# Patient Record
Sex: MALE | Race: WHITE | NOT HISPANIC OR LATINO
[De-identification: names, ages, dates, MRNs, and addresses within clinical notes are randomized per-mention and may not be internally consistent; named-entity substitution may affect disease eponyms.]

---
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
## 2019-01-07 ENCOUNTER — OUTPATIENT (OUTPATIENT)
Dept: OUTPATIENT SERVICES | Facility: HOSPITAL | Age: 19
LOS: 1 days | Discharge: HOME | End: 2019-01-07

## 2019-01-07 DIAGNOSIS — F32.1 MAJOR DEPRESSIVE DISORDER, SINGLE EPISODE, MODERATE: ICD-10-CM

## 2019-01-08 ENCOUNTER — OUTPATIENT (OUTPATIENT)
Dept: OUTPATIENT SERVICES | Facility: HOSPITAL | Age: 19
LOS: 1 days | Discharge: HOME | End: 2019-01-08

## 2019-01-08 DIAGNOSIS — F32.1 MAJOR DEPRESSIVE DISORDER, SINGLE EPISODE, MODERATE: ICD-10-CM

## 2019-04-02 ENCOUNTER — OUTPATIENT (OUTPATIENT)
Dept: OUTPATIENT SERVICES | Facility: HOSPITAL | Age: 19
LOS: 1 days | Discharge: HOME | End: 2019-04-02

## 2019-04-02 DIAGNOSIS — F32.1 MAJOR DEPRESSIVE DISORDER, SINGLE EPISODE, MODERATE: ICD-10-CM

## 2019-04-16 ENCOUNTER — OUTPATIENT (OUTPATIENT)
Dept: OUTPATIENT SERVICES | Facility: HOSPITAL | Age: 19
LOS: 1 days | Discharge: HOME | End: 2019-04-16

## 2019-04-16 DIAGNOSIS — F32.1 MAJOR DEPRESSIVE DISORDER, SINGLE EPISODE, MODERATE: ICD-10-CM

## 2019-04-24 NOTE — PROGRESS NOTE BEHAVIORAL HEALTH - SECONDARY DX1
Prophylactic measure
OCD (obsessive compulsive disorder)
Type 2 diabetes mellitus
OCD (obsessive compulsive disorder)

## 2019-05-14 ENCOUNTER — OUTPATIENT (OUTPATIENT)
Dept: OUTPATIENT SERVICES | Facility: HOSPITAL | Age: 19
LOS: 1 days | Discharge: HOME | End: 2019-05-14
Payer: COMMERCIAL

## 2019-05-14 DIAGNOSIS — F42.2 MIXED OBSESSIONAL THOUGHTS AND ACTS: ICD-10-CM

## 2019-05-14 PROCEDURE — 99213 OFFICE O/P EST LOW 20 MIN: CPT | Mod: GC
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
## 2019-07-11 ENCOUNTER — OUTPATIENT (OUTPATIENT)
Dept: OUTPATIENT SERVICES | Facility: HOSPITAL | Age: 19
LOS: 1 days | Discharge: HOME | End: 2019-07-11
Payer: COMMERCIAL

## 2019-07-11 DIAGNOSIS — F32.1 MAJOR DEPRESSIVE DISORDER, SINGLE EPISODE, MODERATE: ICD-10-CM

## 2019-07-11 DIAGNOSIS — F42.9 OBSESSIVE-COMPULSIVE DISORDER, UNSPECIFIED: ICD-10-CM

## 2019-07-11 PROCEDURE — 99214 OFFICE O/P EST MOD 30 MIN: CPT | Mod: GC

## 2019-07-18 ENCOUNTER — OUTPATIENT (OUTPATIENT)
Dept: OUTPATIENT SERVICES | Facility: HOSPITAL | Age: 19
LOS: 1 days | Discharge: HOME | End: 2019-07-18
Payer: COMMERCIAL

## 2019-07-18 DIAGNOSIS — F42.9 OBSESSIVE-COMPULSIVE DISORDER, UNSPECIFIED: ICD-10-CM

## 2019-07-18 DIAGNOSIS — F42.2 MIXED OBSESSIONAL THOUGHTS AND ACTS: ICD-10-CM

## 2019-07-18 PROCEDURE — 99214 OFFICE O/P EST MOD 30 MIN: CPT | Mod: GC

## 2019-08-01 ENCOUNTER — OUTPATIENT (OUTPATIENT)
Dept: OUTPATIENT SERVICES | Facility: HOSPITAL | Age: 19
LOS: 1 days | Discharge: HOME | End: 2019-08-01
Payer: COMMERCIAL

## 2019-08-01 DIAGNOSIS — F32.1 MAJOR DEPRESSIVE DISORDER, SINGLE EPISODE, MODERATE: ICD-10-CM

## 2019-08-01 PROCEDURE — 99213 OFFICE O/P EST LOW 20 MIN: CPT | Mod: GC

## 2019-08-22 ENCOUNTER — OUTPATIENT (OUTPATIENT)
Dept: OUTPATIENT SERVICES | Facility: HOSPITAL | Age: 19
LOS: 1 days | Discharge: HOME | End: 2019-08-22
Payer: COMMERCIAL

## 2019-08-22 DIAGNOSIS — F32.1 MAJOR DEPRESSIVE DISORDER, SINGLE EPISODE, MODERATE: ICD-10-CM

## 2019-08-22 DIAGNOSIS — F42.2 MIXED OBSESSIONAL THOUGHTS AND ACTS: ICD-10-CM

## 2019-08-22 PROCEDURE — 99213 OFFICE O/P EST LOW 20 MIN: CPT | Mod: GC

## 2019-08-27 ENCOUNTER — OUTPATIENT (OUTPATIENT)
Dept: OUTPATIENT SERVICES | Facility: HOSPITAL | Age: 19
LOS: 1 days | Discharge: HOME | End: 2019-08-27

## 2019-08-27 DIAGNOSIS — E78.00 PURE HYPERCHOLESTEROLEMIA, UNSPECIFIED: ICD-10-CM

## 2019-09-16 ENCOUNTER — OUTPATIENT (OUTPATIENT)
Dept: OUTPATIENT SERVICES | Facility: HOSPITAL | Age: 19
LOS: 1 days | Discharge: HOME | End: 2019-09-16
Payer: COMMERCIAL

## 2019-09-16 DIAGNOSIS — F32.1 MAJOR DEPRESSIVE DISORDER, SINGLE EPISODE, MODERATE: ICD-10-CM

## 2019-09-16 PROCEDURE — 99214 OFFICE O/P EST MOD 30 MIN: CPT

## 2019-11-06 ENCOUNTER — OUTPATIENT (OUTPATIENT)
Dept: OUTPATIENT SERVICES | Facility: HOSPITAL | Age: 19
LOS: 1 days | Discharge: HOME | End: 2019-11-06
Payer: COMMERCIAL

## 2019-11-06 DIAGNOSIS — F32.1 MAJOR DEPRESSIVE DISORDER, SINGLE EPISODE, MODERATE: ICD-10-CM

## 2019-11-06 PROCEDURE — 99214 OFFICE O/P EST MOD 30 MIN: CPT | Mod: GC

## 2019-11-06 NOTE — PROGRESS NOTE BEHAVIORAL HEALTH - MUSCLE TONE / STRENGTH
Spoke with physician and PA, patient remains outside of ED in wheelchair. Per provider, do not give blood pressure med, cancel CT scan. Spoke with patient and family outside.   
Normal muscle tone/strength

## 2020-01-21 NOTE — H&P ADULT - CONSTITUTIONAL
bilateral lower extremity strength 3+ to 4-/5 throughout, assessed in sitting Well-developed, well nourished

## 2020-04-08 ENCOUNTER — OUTPATIENT (OUTPATIENT)
Dept: OUTPATIENT SERVICES | Facility: HOSPITAL | Age: 20
LOS: 1 days | Discharge: HOME | End: 2020-04-08

## 2020-04-08 DIAGNOSIS — F42.9 OBSESSIVE-COMPULSIVE DISORDER, UNSPECIFIED: ICD-10-CM

## 2020-05-21 ENCOUNTER — OUTPATIENT (OUTPATIENT)
Dept: OUTPATIENT SERVICES | Facility: HOSPITAL | Age: 20
LOS: 1 days | Discharge: HOME | End: 2020-05-21

## 2020-05-22 DIAGNOSIS — F42.2 MIXED OBSESSIONAL THOUGHTS AND ACTS: ICD-10-CM

## 2020-05-22 DIAGNOSIS — F32.1 MAJOR DEPRESSIVE DISORDER, SINGLE EPISODE, MODERATE: ICD-10-CM

## 2020-07-07 NOTE — ED PEDIATRIC TRIAGE NOTE - NS_BH TRG QUESTION3_ED_ALL_ED
Patient seen 6/29. Gabapentin increased to 300 mg TID.  FYI
Pt calling stating she was seen last week and was put on an extra gabapentin and pt is calling to states it really helped, any questions call at above number.
No

## 2020-07-08 ENCOUNTER — OUTPATIENT (OUTPATIENT)
Dept: OUTPATIENT SERVICES | Facility: HOSPITAL | Age: 20
LOS: 1 days | Discharge: HOME | End: 2020-07-08
Payer: COMMERCIAL

## 2020-07-08 DIAGNOSIS — F42.2 MIXED OBSESSIONAL THOUGHTS AND ACTS: ICD-10-CM

## 2020-07-08 DIAGNOSIS — F32.1 MAJOR DEPRESSIVE DISORDER, SINGLE EPISODE, MODERATE: ICD-10-CM

## 2020-07-08 PROCEDURE — 99213 OFFICE O/P EST LOW 20 MIN: CPT | Mod: GC

## 2020-07-15 ENCOUNTER — OUTPATIENT (OUTPATIENT)
Dept: OUTPATIENT SERVICES | Facility: HOSPITAL | Age: 20
LOS: 1 days | Discharge: HOME | End: 2020-07-15
Payer: COMMERCIAL

## 2020-07-15 PROCEDURE — 99214 OFFICE O/P EST MOD 30 MIN: CPT | Mod: GC

## 2020-07-16 DIAGNOSIS — F20.9 SCHIZOPHRENIA, UNSPECIFIED: ICD-10-CM

## 2020-07-16 DIAGNOSIS — F42.9 OBSESSIVE-COMPULSIVE DISORDER, UNSPECIFIED: ICD-10-CM

## 2020-07-22 ENCOUNTER — OUTPATIENT (OUTPATIENT)
Dept: OUTPATIENT SERVICES | Facility: HOSPITAL | Age: 20
LOS: 1 days | Discharge: HOME | End: 2020-07-22
Payer: COMMERCIAL

## 2020-07-22 DIAGNOSIS — F42.9 OBSESSIVE-COMPULSIVE DISORDER, UNSPECIFIED: ICD-10-CM

## 2020-07-22 PROCEDURE — 99214 OFFICE O/P EST MOD 30 MIN: CPT | Mod: GC

## 2020-08-19 ENCOUNTER — OUTPATIENT (OUTPATIENT)
Dept: OUTPATIENT SERVICES | Facility: HOSPITAL | Age: 20
LOS: 1 days | Discharge: HOME | End: 2020-08-19
Payer: COMMERCIAL

## 2020-08-19 DIAGNOSIS — F42.9 OBSESSIVE-COMPULSIVE DISORDER, UNSPECIFIED: ICD-10-CM

## 2020-08-19 PROCEDURE — 99213 OFFICE O/P EST LOW 20 MIN: CPT | Mod: GC

## 2020-09-02 RX ORDER — SERTRALINE 25 MG/1
2 TABLET, FILM COATED ORAL
Qty: 60 | Refills: 1
Start: 2020-09-02 | End: 2020-12-12

## 2020-09-02 RX ORDER — SERTRALINE 25 MG/1
1 TABLET, FILM COATED ORAL
Qty: 30 | Refills: 1
Start: 2020-09-02 | End: 2020-12-12

## 2020-09-02 RX ORDER — SERTRALINE 25 MG/1
1 TABLET, FILM COATED ORAL
Qty: 30 | Refills: 1
Start: 2020-09-02 | End: 2020-10-31

## 2020-09-02 RX ORDER — SERTRALINE 25 MG/1
2 TABLET, FILM COATED ORAL
Qty: 60 | Refills: 1
Start: 2020-09-02 | End: 2020-10-31

## 2020-09-16 ENCOUNTER — OUTPATIENT (OUTPATIENT)
Dept: OUTPATIENT SERVICES | Facility: HOSPITAL | Age: 20
LOS: 1 days | Discharge: HOME | End: 2020-09-16
Payer: COMMERCIAL

## 2020-09-16 DIAGNOSIS — F42.2 MIXED OBSESSIONAL THOUGHTS AND ACTS: ICD-10-CM

## 2020-09-16 PROCEDURE — 99213 OFFICE O/P EST LOW 20 MIN: CPT | Mod: GC,95

## 2020-10-14 ENCOUNTER — OUTPATIENT (OUTPATIENT)
Dept: OUTPATIENT SERVICES | Facility: HOSPITAL | Age: 20
LOS: 1 days | Discharge: HOME | End: 2020-10-14
Payer: COMMERCIAL

## 2020-10-14 DIAGNOSIS — F42.2 MIXED OBSESSIONAL THOUGHTS AND ACTS: ICD-10-CM

## 2020-10-14 PROCEDURE — 99213 OFFICE O/P EST LOW 20 MIN: CPT | Mod: GC,95

## 2020-10-28 ENCOUNTER — OUTPATIENT (OUTPATIENT)
Dept: OUTPATIENT SERVICES | Facility: HOSPITAL | Age: 20
LOS: 1 days | Discharge: HOME | End: 2020-10-28
Payer: COMMERCIAL

## 2020-10-28 DIAGNOSIS — F42.2 MIXED OBSESSIONAL THOUGHTS AND ACTS: ICD-10-CM

## 2020-10-28 PROCEDURE — 99213 OFFICE O/P EST LOW 20 MIN: CPT | Mod: GC,95

## 2020-12-02 ENCOUNTER — APPOINTMENT (OUTPATIENT)
Dept: PSYCHIATRY | Facility: CLINIC | Age: 20
End: 2020-12-02

## 2020-12-16 ENCOUNTER — APPOINTMENT (OUTPATIENT)
Dept: PSYCHIATRY | Facility: CLINIC | Age: 20
End: 2020-12-16

## 2020-12-30 ENCOUNTER — APPOINTMENT (OUTPATIENT)
Dept: PSYCHIATRY | Facility: CLINIC | Age: 20
End: 2020-12-30

## 2021-01-21 ENCOUNTER — APPOINTMENT (OUTPATIENT)
Dept: PSYCHIATRY | Facility: CLINIC | Age: 21
End: 2021-01-21

## 2021-06-23 ENCOUNTER — NON-APPOINTMENT (OUTPATIENT)
Age: 21
End: 2021-06-23

## 2021-06-30 ENCOUNTER — OUTPATIENT (OUTPATIENT)
Dept: OUTPATIENT SERVICES | Facility: HOSPITAL | Age: 21
LOS: 1 days | Discharge: HOME | End: 2021-06-30

## 2021-06-30 ENCOUNTER — APPOINTMENT (OUTPATIENT)
Dept: PSYCHIATRY | Facility: CLINIC | Age: 21
End: 2021-06-30

## 2021-06-30 DIAGNOSIS — F42.2 MIXED OBSESSIONAL THOUGHTS AND ACTS: ICD-10-CM

## 2021-07-09 ENCOUNTER — APPOINTMENT (OUTPATIENT)
Dept: PSYCHIATRY | Facility: CLINIC | Age: 21
End: 2021-07-09

## 2021-07-09 ENCOUNTER — OUTPATIENT (OUTPATIENT)
Dept: OUTPATIENT SERVICES | Facility: HOSPITAL | Age: 21
LOS: 1 days | Discharge: HOME | End: 2021-07-09

## 2021-07-09 DIAGNOSIS — F42.2 MIXED OBSESSIONAL THOUGHTS AND ACTS: ICD-10-CM

## 2021-07-16 ENCOUNTER — APPOINTMENT (OUTPATIENT)
Dept: PSYCHIATRY | Facility: CLINIC | Age: 21
End: 2021-07-16

## 2021-07-16 ENCOUNTER — OUTPATIENT (OUTPATIENT)
Dept: OUTPATIENT SERVICES | Facility: HOSPITAL | Age: 21
LOS: 1 days | Discharge: HOME | End: 2021-07-16

## 2021-07-16 DIAGNOSIS — F42.2 MIXED OBSESSIONAL THOUGHTS AND ACTS: ICD-10-CM

## 2021-07-30 ENCOUNTER — APPOINTMENT (OUTPATIENT)
Dept: PSYCHIATRY | Facility: CLINIC | Age: 21
End: 2021-07-30

## 2021-07-30 ENCOUNTER — OUTPATIENT (OUTPATIENT)
Dept: OUTPATIENT SERVICES | Facility: HOSPITAL | Age: 21
LOS: 1 days | Discharge: HOME | End: 2021-07-30

## 2021-07-30 DIAGNOSIS — F42.2 MIXED OBSESSIONAL THOUGHTS AND ACTS: ICD-10-CM

## 2021-08-20 ENCOUNTER — APPOINTMENT (OUTPATIENT)
Dept: PSYCHIATRY | Facility: CLINIC | Age: 21
End: 2021-08-20

## 2021-08-20 ENCOUNTER — OUTPATIENT (OUTPATIENT)
Dept: OUTPATIENT SERVICES | Facility: HOSPITAL | Age: 21
LOS: 1 days | Discharge: HOME | End: 2021-08-20

## 2021-08-20 DIAGNOSIS — F42.2 MIXED OBSESSIONAL THOUGHTS AND ACTS: ICD-10-CM

## 2021-09-17 ENCOUNTER — APPOINTMENT (OUTPATIENT)
Dept: PSYCHIATRY | Facility: CLINIC | Age: 21
End: 2021-09-17

## 2021-09-17 ENCOUNTER — OUTPATIENT (OUTPATIENT)
Dept: OUTPATIENT SERVICES | Facility: HOSPITAL | Age: 21
LOS: 1 days | Discharge: HOME | End: 2021-09-17

## 2021-09-17 DIAGNOSIS — F42.2 MIXED OBSESSIONAL THOUGHTS AND ACTS: ICD-10-CM

## 2021-10-21 ENCOUNTER — OUTPATIENT (OUTPATIENT)
Dept: OUTPATIENT SERVICES | Facility: HOSPITAL | Age: 21
LOS: 1 days | Discharge: HOME | End: 2021-10-21

## 2021-10-21 ENCOUNTER — APPOINTMENT (OUTPATIENT)
Dept: PSYCHIATRY | Facility: CLINIC | Age: 21
End: 2021-10-21

## 2021-10-21 DIAGNOSIS — F42.2 MIXED OBSESSIONAL THOUGHTS AND ACTS: ICD-10-CM

## 2021-11-05 ENCOUNTER — OUTPATIENT (OUTPATIENT)
Dept: OUTPATIENT SERVICES | Facility: HOSPITAL | Age: 21
LOS: 1 days | Discharge: HOME | End: 2021-11-05

## 2021-11-05 ENCOUNTER — APPOINTMENT (OUTPATIENT)
Dept: PSYCHIATRY | Facility: CLINIC | Age: 21
End: 2021-11-05

## 2021-11-05 DIAGNOSIS — F42.2 MIXED OBSESSIONAL THOUGHTS AND ACTS: ICD-10-CM

## 2021-12-03 ENCOUNTER — APPOINTMENT (OUTPATIENT)
Dept: PSYCHIATRY | Facility: CLINIC | Age: 21
End: 2021-12-03

## 2022-03-08 NOTE — BEHAVIORAL HEALTH ASSESSMENT NOTE - NS ED BHA AXIS I PRIMARY CODE FT
CC: 62 yr old female with small left pleural effusion now increasing in size.  Pt symptomatic with shortness of breath on exertion, warranting admission, is now s/p chest tube on 3/4/22.  She has a h/o Gastroesophageal cancer with omental metastases.  She had a partial SBO one month ago and once again has not been passing gas or  had BMs since 3/3/22. CT abdo noted.   Pt states her abdo pain is improving, no nausea/vomiting - is drinking water. Poor po intake and weight loss noted in the past few weeks.  No ha lightheadedness cp palps or sob since chest tube was inserted. No tingling or numbness anywhere.    3/8 - denies ha cp palps sob lightheadedness, ambulating in hallway, on CLD     Vital Signs Last 24 Hrs  T(F): 98.4 (08 Mar 2022 15:00), Max: 98.4 (08 Mar 2022 15:00)  HR: 67 (08 Mar 2022 15:00) (58 - 72)  BP: 129/77 (08 Mar 2022 15:00) (129/77 - 142/73)  RR: 18 (08 Mar 2022 15:00) (18 - 18)  SpO2: 98% (08 Mar 2022 15:00) (95% - 99%)  Constitutional: NAD, awake and alert  HEENT: PERR, EOMI  Neck: Soft and supple,  No JVD  Respiratory: Breath sounds are clear bilaterally, No wheezing, rales or rhonchi; left chest tube   Cardiovascular: S1 and S2, regular rate and rhythm, no Murmurs  Gastrointestinal: soft, nontender, nondistended  Extremities: No peripheral edema  Vascular: 2+ peripheral pulses  Neurological: A/O x 3, no focal deficits  Musculoskeletal: 5/5 strength b/l upper and lower extremities  Skin: No rashes    RADIOLOGY/EKG:  < from: Xray Chest 1 View- PORTABLE-Routine (Xray Chest 1 View- PORTABLE-Routine in AM.) (03.06.22 @ 09:39) >  Impression: Status post left chest pigtail catheter. Status post Mediport   placement. Heart size unremarkable. Lungs are clear. No pneumothorax. No   pleural effusion.      CTA/P  IMPRESSION:  1. A previous esophagectomy, a gastric pull-through.  2. A nodularity thyroid gland, a dominant left nodule with calcifications  measures 15 mm, recommend ultrasound correlation.  3. A prominent low-density lymph node within the mediastinum measures 11   mm.  4. A large left pleural effusion, underlying atelectasis.  5. An indeterminate nodule within the left adrenal gland measures 2.3 cm   no  significant interval change.  6. An indeterminate nodule within the right adrenal gland measures 2.3   cm, no  significant interval change.  7. Small ascites. Enlarging anterior peritoneal implants and perihepatic   implants.  8. Worsening small bowel obstruction with a transition zone in the mid to   lower abdomen deep to the abdominal wall where there is soft tissue   thickening of a collapsed small bowel loop, increasing with increasing   surrounding peritoneal soft tissue implants.  9. Small enhancing nodules along the periphery of the right hepatic lobe   are suspicious for metastases.      LABS: All Labs Reviewed:                   12.0   11.71 )-----------( 236      ( 08 Mar 2022 06:13 )             36.6  138  |  108  |  13  ----------------------------<  192<H>  3.7   |  26  |  0.52  Ca    8.4<L>      08 Mar 2022 06:13  Phos  3.1     03-08  Mg     2.3     03-08  TPro  5.1<L>  /  Alb  2.5<L>  /  TBili  0.4  /  DBili  x   /  AST  129<H>  /  ALT  180<H>  /  AlkPhos  318<H>  03-08        acetaminophen   IVPB .. 1000 milliGRAM(s) IV Intermittent every 6 hours PRN  ALBUTerol    90 MICROgram(s) HFA Inhaler 2 Puff(s) Inhalation every 6 hours PRN  fat emulsion (Fish Oil and Plant Based) 20% Infusion 0.68 Gm/kG/Day IV Continuous <Continuous>  melatonin 5 milliGRAM(s) Oral at bedtime  metoprolol tartrate Injectable 5 milliGRAM(s) IV Push every 6 hours PRN  morphine  - Injectable 2 milliGRAM(s) IV Push every 4 hours PRN  multivitamin 1 Tablet(s) Oral daily  octreotide  Injectable 100 MICROGram(s) IV Push every 8 hours  ondansetron Injectable 4 milliGRAM(s) IV Push every 6 hours PRN  pantoprazole  Injectable 40 milliGRAM(s) IV Push every 12 hours  Parenteral Nutrition - Adult 1 Each TPN Continuous <Continuous>  Parenteral Nutrition - Adult 1 Each TPN Continuous <Continuous>  potassium phosphate / sodium phosphate Powder (PHOS-NaK) 1 Packet(s) Oral daily  topiramate 25 milliGRAM(s) Oral every 12 hours                 CC: 62 yr old female with small left pleural effusion now increasing in size.  Pt symptomatic with shortness of breath on exertion, warranting admission, is now s/p chest tube on 3/4/22.  She has a h/o Gastroesophageal cancer with omental metastases.  She had a partial SBO one month ago and once again has not been passing gas or  had BMs since 3/3/22. CT abdo noted.   Pt states her abdo pain is improving, no nausea/vomiting - is drinking water. Poor po intake and weight loss noted in the past few weeks.  No ha lightheadedness cp palps or sob since chest tube was inserted. No tingling or numbness anywhere.    3/8 - denies ha cp palps sob lightheadedness, ambulating in hallway, on CLD     Vital Signs Last 24 Hrs  T(F): 98.4 (08 Mar 2022 15:00), Max: 98.4 (08 Mar 2022 15:00)  HR: 67 (08 Mar 2022 15:00) (58 - 72)  BP: 129/77 (08 Mar 2022 15:00) (129/77 - 142/73)  RR: 18 (08 Mar 2022 15:00) (18 - 18)  SpO2: 98% (08 Mar 2022 15:00) (95% - 99%)  Constitutional: NAD, awake and alert  HEENT: PERR, EOMI  Neck: Soft and supple,  No JVD  Respiratory: Breath sounds are clear bilaterally, No wheezing, rales or rhonchi; left chest tube   Cardiovascular: S1 and S2, regular rate and rhythm, no Murmurs  Gastrointestinal: soft, nontender, nondistended  Extremities: No peripheral edema  Vascular: 2+ peripheral pulses  Neurological: A/O x 3, no focal deficits  Musculoskeletal: 5/5 strength b/l upper and lower extremities  Skin: No rashes    RADIOLOGY/EKG:  < from: Xray Chest 1 View- PORTABLE-Routine (Xray Chest 1 View- PORTABLE-Routine in AM.) (03.06.22 @ 09:39) >  Impression: Status post left chest pigtail catheter. Status post Mediport   placement. Heart size unremarkable. Lungs are clear. No pneumothorax. No   pleural effusion.      CTA/P  IMPRESSION:  1. A previous esophagectomy, a gastric pull-through.  2. A nodularity thyroid gland, a dominant left nodule with calcifications  measures 15 mm, recommend ultrasound correlation.  3. A prominent low-density lymph node within the mediastinum measures 11   mm.  4. A large left pleural effusion, underlying atelectasis.  5. An indeterminate nodule within the left adrenal gland measures 2.3 cm   no  significant interval change.  6. An indeterminate nodule within the right adrenal gland measures 2.3   cm, no  significant interval change.  7. Small ascites. Enlarging anterior peritoneal implants and perihepatic   implants.  8. Worsening small bowel obstruction with a transition zone in the mid to   lower abdomen deep to the abdominal wall where there is soft tissue   thickening of a collapsed small bowel loop, increasing with increasing   surrounding peritoneal soft tissue implants.  9. Small enhancing nodules along the periphery of the right hepatic lobe   are suspicious for metastases.    < from: Xray Barium Enema Single Contrast (Xray Barium Enema Single Contrast .) (03.07.22 @ 15:12) >  Moderate to severe strictures in the proximal and mid sigmoid colon which   are likely due to serosal implants.      LABS: All Labs Reviewed:                   12.0   11.71 )-----------( 236      ( 08 Mar 2022 06:13 )             36.6  138  |  108  |  13  ----------------------------<  192<H>  3.7   |  26  |  0.52  Ca    8.4<L>      08 Mar 2022 06:13  Phos  3.1     03-08  Mg     2.3     03-08  TPro  5.1<L>  /  Alb  2.5<L>  /  TBili  0.4  /  DBili  x   /  AST  129<H>  /  ALT  180<H>  /  AlkPhos  318<H>  03-08        acetaminophen   IVPB .. 1000 milliGRAM(s) IV Intermittent every 6 hours PRN  ALBUTerol    90 MICROgram(s) HFA Inhaler 2 Puff(s) Inhalation every 6 hours PRN  fat emulsion (Fish Oil and Plant Based) 20% Infusion 0.68 Gm/kG/Day IV Continuous <Continuous>  melatonin 5 milliGRAM(s) Oral at bedtime  metoprolol tartrate Injectable 5 milliGRAM(s) IV Push every 6 hours PRN  morphine  - Injectable 2 milliGRAM(s) IV Push every 4 hours PRN  multivitamin 1 Tablet(s) Oral daily  octreotide  Injectable 100 MICROGram(s) IV Push every 8 hours  ondansetron Injectable 4 milliGRAM(s) IV Push every 6 hours PRN  pantoprazole  Injectable 40 milliGRAM(s) IV Push every 12 hours  Parenteral Nutrition - Adult 1 Each TPN Continuous <Continuous>  Parenteral Nutrition - Adult 1 Each TPN Continuous <Continuous>  potassium phosphate / sodium phosphate Powder (PHOS-NaK) 1 Packet(s) Oral daily  topiramate 25 milliGRAM(s) Oral every 12 hours                 F42.9

## 2022-03-25 ENCOUNTER — APPOINTMENT (OUTPATIENT)
Dept: PSYCHIATRY | Facility: CLINIC | Age: 22
End: 2022-03-25

## 2022-03-25 ENCOUNTER — OUTPATIENT (OUTPATIENT)
Dept: OUTPATIENT SERVICES | Facility: HOSPITAL | Age: 22
LOS: 1 days | Discharge: HOME | End: 2022-03-25

## 2022-03-25 DIAGNOSIS — F42.2 MIXED OBSESSIONAL THOUGHTS AND ACTS: ICD-10-CM

## 2022-04-01 ENCOUNTER — OUTPATIENT (OUTPATIENT)
Dept: OUTPATIENT SERVICES | Facility: HOSPITAL | Age: 22
LOS: 1 days | Discharge: HOME | End: 2022-04-01

## 2022-04-01 ENCOUNTER — APPOINTMENT (OUTPATIENT)
Dept: PSYCHIATRY | Facility: CLINIC | Age: 22
End: 2022-04-01

## 2022-04-01 DIAGNOSIS — F42.2 MIXED OBSESSIONAL THOUGHTS AND ACTS: ICD-10-CM

## 2022-04-15 ENCOUNTER — OUTPATIENT (OUTPATIENT)
Dept: OUTPATIENT SERVICES | Facility: HOSPITAL | Age: 22
LOS: 1 days | Discharge: HOME | End: 2022-04-15

## 2022-04-15 ENCOUNTER — APPOINTMENT (OUTPATIENT)
Dept: PSYCHIATRY | Facility: CLINIC | Age: 22
End: 2022-04-15

## 2022-04-15 DIAGNOSIS — F42.9 OBSESSIVE-COMPULSIVE DISORDER, UNSPECIFIED: ICD-10-CM

## 2022-04-22 ENCOUNTER — OUTPATIENT (OUTPATIENT)
Dept: OUTPATIENT SERVICES | Facility: HOSPITAL | Age: 22
LOS: 1 days | Discharge: HOME | End: 2022-04-22

## 2022-04-22 ENCOUNTER — APPOINTMENT (OUTPATIENT)
Dept: PSYCHIATRY | Facility: CLINIC | Age: 22
End: 2022-04-22

## 2022-04-22 DIAGNOSIS — F42.2 MIXED OBSESSIONAL THOUGHTS AND ACTS: ICD-10-CM

## 2022-05-13 ENCOUNTER — OUTPATIENT (OUTPATIENT)
Dept: OUTPATIENT SERVICES | Facility: HOSPITAL | Age: 22
LOS: 1 days | Discharge: HOME | End: 2022-05-13

## 2022-05-13 ENCOUNTER — APPOINTMENT (OUTPATIENT)
Dept: PSYCHIATRY | Facility: CLINIC | Age: 22
End: 2022-05-13

## 2022-05-13 DIAGNOSIS — F42.2 MIXED OBSESSIONAL THOUGHTS AND ACTS: ICD-10-CM

## 2022-05-26 ENCOUNTER — APPOINTMENT (OUTPATIENT)
Dept: PSYCHIATRY | Facility: CLINIC | Age: 22
End: 2022-05-26

## 2022-05-26 ENCOUNTER — OUTPATIENT (OUTPATIENT)
Dept: OUTPATIENT SERVICES | Facility: HOSPITAL | Age: 22
LOS: 1 days | Discharge: HOME | End: 2022-05-26

## 2022-05-26 DIAGNOSIS — F42.2 MIXED OBSESSIONAL THOUGHTS AND ACTS: ICD-10-CM

## 2022-06-09 ENCOUNTER — APPOINTMENT (OUTPATIENT)
Dept: PSYCHIATRY | Facility: CLINIC | Age: 22
End: 2022-06-09

## 2022-06-28 ENCOUNTER — NON-APPOINTMENT (OUTPATIENT)
Age: 22
End: 2022-06-28

## 2022-06-28 ENCOUNTER — OUTPATIENT (OUTPATIENT)
Dept: OUTPATIENT SERVICES | Facility: HOSPITAL | Age: 22
LOS: 1 days | Discharge: HOME | End: 2022-06-28

## 2022-06-28 ENCOUNTER — APPOINTMENT (OUTPATIENT)
Dept: PSYCHIATRY | Facility: CLINIC | Age: 22
End: 2022-06-28

## 2022-06-28 DIAGNOSIS — F42.2 MIXED OBSESSIONAL THOUGHTS AND ACTS: ICD-10-CM

## 2022-07-07 ENCOUNTER — APPOINTMENT (OUTPATIENT)
Dept: PSYCHIATRY | Facility: CLINIC | Age: 22
End: 2022-07-07

## 2022-07-07 ENCOUNTER — OUTPATIENT (OUTPATIENT)
Dept: OUTPATIENT SERVICES | Facility: HOSPITAL | Age: 22
LOS: 1 days | Discharge: HOME | End: 2022-07-07

## 2022-07-07 DIAGNOSIS — F42.2 MIXED OBSESSIONAL THOUGHTS AND ACTS: ICD-10-CM

## 2022-07-21 ENCOUNTER — OUTPATIENT (OUTPATIENT)
Dept: OUTPATIENT SERVICES | Facility: HOSPITAL | Age: 22
LOS: 1 days | Discharge: HOME | End: 2022-07-21

## 2022-07-21 ENCOUNTER — APPOINTMENT (OUTPATIENT)
Dept: PSYCHIATRY | Facility: CLINIC | Age: 22
End: 2022-07-21

## 2022-07-21 DIAGNOSIS — F42.2 MIXED OBSESSIONAL THOUGHTS AND ACTS: ICD-10-CM

## 2022-08-04 ENCOUNTER — OUTPATIENT (OUTPATIENT)
Dept: OUTPATIENT SERVICES | Facility: HOSPITAL | Age: 22
LOS: 1 days | Discharge: HOME | End: 2022-08-04

## 2022-08-04 ENCOUNTER — APPOINTMENT (OUTPATIENT)
Dept: PSYCHIATRY | Facility: CLINIC | Age: 22
End: 2022-08-04

## 2022-08-04 DIAGNOSIS — F42.2 MIXED OBSESSIONAL THOUGHTS AND ACTS: ICD-10-CM

## 2022-08-18 ENCOUNTER — OUTPATIENT (OUTPATIENT)
Dept: OUTPATIENT SERVICES | Facility: HOSPITAL | Age: 22
LOS: 1 days | Discharge: HOME | End: 2022-08-18

## 2022-08-18 ENCOUNTER — APPOINTMENT (OUTPATIENT)
Dept: PSYCHIATRY | Facility: CLINIC | Age: 22
End: 2022-08-18

## 2022-08-18 DIAGNOSIS — F42.2 MIXED OBSESSIONAL THOUGHTS AND ACTS: ICD-10-CM

## 2022-08-31 ENCOUNTER — APPOINTMENT (OUTPATIENT)
Dept: PSYCHIATRY | Facility: CLINIC | Age: 22
End: 2022-08-31

## 2022-08-31 ENCOUNTER — OUTPATIENT (OUTPATIENT)
Dept: OUTPATIENT SERVICES | Facility: HOSPITAL | Age: 22
LOS: 1 days | Discharge: HOME | End: 2022-08-31

## 2022-08-31 DIAGNOSIS — F42.2 MIXED OBSESSIONAL THOUGHTS AND ACTS: ICD-10-CM

## 2022-09-15 ENCOUNTER — APPOINTMENT (OUTPATIENT)
Dept: PSYCHIATRY | Facility: CLINIC | Age: 22
End: 2022-09-15

## 2022-09-15 ENCOUNTER — OUTPATIENT (OUTPATIENT)
Dept: OUTPATIENT SERVICES | Facility: HOSPITAL | Age: 22
LOS: 1 days | Discharge: HOME | End: 2022-09-15

## 2022-09-15 DIAGNOSIS — F42.2 MIXED OBSESSIONAL THOUGHTS AND ACTS: ICD-10-CM

## 2022-10-20 ENCOUNTER — APPOINTMENT (OUTPATIENT)
Dept: PSYCHIATRY | Facility: CLINIC | Age: 22
End: 2022-10-20

## 2022-10-20 ENCOUNTER — OUTPATIENT (OUTPATIENT)
Dept: OUTPATIENT SERVICES | Facility: HOSPITAL | Age: 22
LOS: 1 days | Discharge: HOME | End: 2022-10-20

## 2022-10-20 DIAGNOSIS — F42.2 MIXED OBSESSIONAL THOUGHTS AND ACTS: ICD-10-CM

## 2022-10-21 NOTE — PAST MEDICAL HISTORY
[FreeTextEntry1] : PPH: 1IPP admission in 2018 for OCD, No SA/NSSIB\par Past meds: hydroxyzine\par Substance: denies\par Social: single, never dated, interested in women, lives with mom and brother, has yoselin tristan, graduated hs. attending [employer] unemployed, enjoys reading and video games (3-4hrs/day). Jainism.\par \par IBS, ASD

## 2022-10-21 NOTE — PLAN
[Medication education provided] : Medication education provided. [Rationale for medication choices, possible risks/precautions, benefits, alternative treatment choices, and consequences of non-treatment discussed] : Rationale for medication choices, possible risks/precautions, benefits, alternative treatment choices, and consequences of non-treatment discussed with patient/family/caregiver

## 2022-10-21 NOTE — RESULTS/DATA
[FreeTextEntry1] : 8/27/2019 chol 195, HDL 49, , , ha1c 5.3, gluc 105\par Weight 256(11/6/2019)\par Weight 268 (1/22/2020)\par Weight  273lbs  (12/2/20) \par

## 2022-10-21 NOTE — PHYSICAL EXAM
[FreeTextEntry2] : unable to assess via telehealth [FreeTextEntry3] : unable to assess via telehealth [FreeTextEntry4] : unable to assess via telehealth [Cooperative] : cooperative [Euthymic] : euthymic [Clear] : clear [Linear/Goal Directed] : linear/goal directed [None Reported] : none reported [None] : none [Average] : average [WNL] : within normal limits [Obsessions] : obsessions [Constricted] : constricted [Normal] : normal [Adeq for basic needs] : adequate for basic needs [FreeTextEntry5] : somewhat monotonous [FreeTextEntry6] : concrete [de-identified] : not assessed

## 2022-10-21 NOTE — HISTORY OF PRESENT ILLNESS
[de-identified] : Kan was assessed by [employer].  Kan was located at home at [address], [facility] [address].  Writer is located at [address] [facility] [address].  Pt consented to the use of telehealth for this visit.\par \par Kan endorses anxiety is still well controlled now that school is out for summer.  He continues to endorse compulsions of licking the wall but is only engaging in this about 2x per week.  He attributes the decrease in compulsions to his lower stress level right now.  He states that the compulsion is easier to resist when he's in public and knows that it's inappropriate behavior.  He states that while at home it's very difficult to resist and usually the behavior happens before he is even able to think about resisting, however he is using coping skills and mindfulness to resist compulsion and ease his anxiety.  He continues to endorse obsessional thoughts that if compulsions are not given into, he will "go to hell."\par \par Patient reflects on the improvement in his symptoms and the freedom it has given him to enjoy social activities and school, as well as significant improvement in his overall function.  The improvement in his anxiety and OCD symptoms has mad a significant impact on his life, however when asked about specific goals, he does not have many.  He only states "I just want to pass my classes."  He is not focused on specific career goals, milestones in education.  When asked about relationships, he states that he wants ot wait his emotions to be better controlled before he pursues a relationship.  Overall he does not appear very interested in a relationship at this time and cannot tell the writer what "being read" looks like other than "having less emotional issues."  \par \par Writer focused the session on exposure therapy, coping skills/mindfulness, anxiety reduction skills.  Patient was motivated and responded well to the intervention with notable reduction in anxiety.   \par \par He is compliant with sertraline and denies side effects. Session focused on supporting Kan in his short term and long term goals.  He denied any acute concerns.  Denies depressive symptoms, psychotic symptoms, manic symptoms.  Denies suicidal ideations or passive death wish or NSSIB.   [No] : no [Home] : at home, [unfilled] , at the time of the visit. [Medical Office: ([facility])___] : at the medical office located in  [Verbal consent obtained from patient] : the patient, [unfilled]

## 2022-10-21 NOTE — DISCUSSION/SUMMARY
[FreeTextEntry1] : Kan Tellez is a 21-year-old, male, single, domiciled with mom and brother, unemployed, student at [employer] with pmh of IBS and pph of OCD, ASD, and MDD with 1 prior IPP admission presenting for follow up.  \par \par Patient following in clinic for management of OCD symptoms, with remission of depressive symptoms. Patient continues to have intrusive thoughts of "going to hell" associated with compulsions. Tolerating current medication regimen with some improvement in symptoms. Discussed health management and writer reviewed the risks and benefits of current treatment/medication and discussed alternatives.  Pt expressed informed understanding.  Despite the risks associated with taking a high dose of SSRI, considering the patient's debilitating level of OCD symptoms (at baseline), the benefits outweigh those risks; furthermore the pt is understanding of the risks and potential adverse effects and has elected to continue current treatment plan. \par \par - Continue with Zoloft 250mg daily for OCD \par - RTC in 1 month - patient no longer wants to continue with biweekly therapy, prefers monthly \par - Writer advised patient to obtain ECG and and labs with PCP and have results faxed to the writer.  He expressed agreement.

## 2022-10-21 NOTE — CURRENT PSYCHIATRIC SYMPTOMS
[Depressed Mood] : no depressed mood [Anhedonia] : no anhedonia [Insomnia] : no insomnia disorder [Euphoria] : no euphoria [Highly Irritable] : no high irritability [Grandeur] : no feelings of grandeur [Excessive Worry] : no excessive worries [Obsessions] : obsessions

## 2022-11-17 NOTE — PROGRESS NOTE BEHAVIORAL HEALTH - NS ED BHA MED ROS GASTROINTESTINAL
Procedure Instructions    Name: Ap Childress   1993      MRN: [id number]  Surgeon: Toney Rhodes MD   Procedure: Right greater saphenous vein radiofrequency ablation and stab phlebectomy   Date of procedure: Wednesday, December 21, 2022a      Arrival time: You will be contacted 2-3 days prior to surgery with time information. Plan on a 09:00 AM arrival time    Procedure times may change due to cancellations and emergencies. If your arrival time changes, you will be notified.    Hospital/Facility: [facility]) [address]. Use main entrance, take elevators to 2nd floor. Turn left off the elevators, turn right at first hallway. Surgery registration desk is located just past the restrooms.      Please review these instructions and call our office with any questions [phone]    THINGS TO PLAN FOR NOW     · No preoperative appointment needed.    · Preprocedure COVID-19 Test Appointment  Test Appointment Date: Sunday, December 18, 2022   Time - 1:00 PM  Location: [facility], [address] [place] [address].     A COVID-19 test is required prior to your procedure or surgery, even if you have been vaccinated. This test must be a COVID PCR test done in a lab  Over the counter or home tests are not acceptable for pre procedural testing.     If you get a COVID-19 test (in a lab or at home) before your appointment above, because of illness or possible exposure, please contact your surgeon's office.  The office will assist you with next steps based on your results.    Medication Instructions     · Please read this section carefully, as some medications may have special instructions.     · STOP TAKING Advil, Motrin, ibuprofen, Aleve/naproxen, Nuprin 3 days before your procedure.  · CONTINUE TAKING aspirin as it was prescribed, up to and including the morning of your procedure if you normally take it in the morning.       · MAY TAKE Tylenol (acetaminophen) until  midnight the night before your procedure.    STOP TAKING over the counter supplements/vitamins, fish oil and herbs 7 days before your procedure. You may continue any potassium and iron supplements.    STOP TAKING Over the counter and prescription diet pills 10 days before your procedure.     If you use an inhaler, take as directed AND bring with you to the hospital.     If you take medications such as sildenafil (Viagra), tadalafil (Cialis), vardenafil (Levitra) etc.:   For erectile dysfunction STOP TAKING 48 hours before your procedure.    For treatment of pulmonary hypertension, consult your prescribing  Provider.    · Take all morning medications as usual.    On the morning of your procedure, take any other medications not mentioned above with a small sip of water.       NOTE: If you are on a specific type of medication called an MAO Inhibitor, please discuss with our office. You may need special instructions.  These medications or patches may need to be stopped weeks before your procedure. You may need a consultation with an anesthesiologist.       Diet Instructions     DO NOT eat solid food after 12:00 midnight on the night before your procedure. This includes all gum, mints or candies.  DO NOT use chewing tobacco or any type of nicotine products    You may have clear liquids until 12:00 midnight on the night before your procedure.  Drink as much fluids as possible until this time to prevent dehydration, unless your provider has advised you to limit fluids.         Bathing Instructions     Night before and morning of procedure:  · Bathe or shower and wash your hair using your usual soap and shampoo.  · Dry your body with a clean fresh towel.          Other Education Provided   • As part of your care, your surgeon may recommend an online education program called [employer].  These educational videos contain important information about your upcoming procedure.  You may receive a phone call or an e-mail with information  on how to watch these videos.   • Educational pamphlet or Krames booklet   • Disclosure of Physician Ownership in [facility] ([facility]), [facility] ([facility])      COVID-19 Information     It is very important to avoid any potential exposure to COVID-19. Please follow these instructions until your surgery or procedure has been completed.      · Avoid unnecessary outings from home. Leave ONLY for essential needs and medical appointments.    · Do NOT travel out of home area (city/town).  · Wash hands frequently and thoroughly with soap and water (lather at least 20 seconds) or disinfect with alcohol-based hand  before eating, before touching face/mouth/eyes, after touching shared objects or high touch surfaces.  · Regularly clean cell phones, door handles, light switches, faucet and toilet handles, bathrooms and kitchen surfaces using household disinfectant or hot soapy water.    · When it is necessary to leave your home for work, school or other essential needs please:   · Maintain physical distancing from others (at least 6 feet) and wear a mask when outside the house unless you are alone.      · For patient and community safety, facilities may have restrictions on persons accompanying patients in the building.  Please confirm current restrictions during preoperative call with hospital nurse.      · Please contact our office at [phone] prior to your procedure date if any of the following apply:  · Patient/Visitor or anyone in household has been diagnosed with, or has a pending test for COVID-19, regardless of vaccination status.  · Patient/Visitor or anyone in household has any of the following symptoms:  fever,  cough,  sore throat, shortness of breath, difficulty breathing, nausea, vomiting, diarrhea, chills, new onset loss of taste or smell, runny nose, congestion, increased fatigue, muscle/body aches, or headache.    · Patient/Visitor will be screened for illness upon  entering the facility and, except for infants/toddlers, will need to wear a mask while inside the facility.  If you have your own mask, please bring this; otherwise you will be provided with a mask. Masks with exhalation valves, gaiters and bandanas are not permitted.  Face shields can be worn in addition to a mask but are not a substitute for wearing a mask.     AFTER YOUR PROCEDURE     Following your procedure/surgery, you may be required to stay a minimum of 2 hours, possibly more. Bring an overnight bag in case you have to stay longer than expected    You may be able to go home the day of your procedure/surgery, but you will not be able to drive yourself home. A responsible adult must drive you home. A taxi/ Uber is not acceptable. If you do not have someone to drive you home, your procedure/surgery may be cancelled.    If you are discharged on the day of surgery, you are required to have someone stay with you for 24 hours.     If needed, a prescription for post-operative medications will be provided before you are discharged.     FOLLOW UP APPOINTMENT     · You are scheduled for a post operative ultrasound on Friday, December 23, 2022 at 1:00 PM.  Please see enclosed letter for instructions  · You are scheduled for a follow up appointment with Toney Rhodes MD for Tuesday, January 3, 2023 at 09:00 AM, second floor, [address].    [x]  Copy provided to patient.  [x] Patient verbalized understanding of instructions and all questions were answered.   Initials: rlb    PLEASE CALL [phone] IF YOU HAVE ANY QUESTIONS OR CONCERNS.         No complaints

## 2022-11-18 NOTE — ED PROVIDER NOTE - NS HIV RISK FACTOR YES
Pt is having sharp LLQ abd pain that started when she woke up this morning. Pt reports it radiated to the left lower back and down into her groin. Pt has loss of appetite. Pt has a hx of perforated bowel, chronic constipation, and abd hernia. A&Ox4 Denies CP, SOB. Pt takes myralax, and prune juice daily.      Declined

## 2022-12-15 ENCOUNTER — OUTPATIENT (OUTPATIENT)
Dept: OUTPATIENT SERVICES | Facility: HOSPITAL | Age: 22
LOS: 1 days | Discharge: HOME | End: 2022-12-15

## 2022-12-15 ENCOUNTER — APPOINTMENT (OUTPATIENT)
Dept: PSYCHIATRY | Facility: CLINIC | Age: 22
End: 2022-12-15

## 2022-12-15 DIAGNOSIS — F42.2 MIXED OBSESSIONAL THOUGHTS AND ACTS: ICD-10-CM

## 2022-12-15 PROCEDURE — 99213 OFFICE O/P EST LOW 20 MIN: CPT | Mod: NC,95

## 2022-12-15 NOTE — PHYSICAL EXAM
[FreeTextEntry2] : unable to assess via telehealth [FreeTextEntry3] : unable to assess via telehealth [FreeTextEntry4] : unable to assess via telehealth [Cooperative] : cooperative [Euthymic] : euthymic [Clear] : clear [Linear/Goal Directed] : linear/goal directed [None] : none [None Reported] : none reported [Average] : average [WNL] : within normal limits [Obsessions] : obsessions [Constricted] : constricted [Normal] : normal [Adeq for basic needs] : adequate for basic needs [FreeTextEntry5] : somewhat monotonous [FreeTextEntry6] : concrete [de-identified] : not assessed

## 2022-12-15 NOTE — DISCUSSION/SUMMARY
[FreeTextEntry1] : Kan Tellez is a 21-year-old, male, single, domiciled with mom and brother, unemployed, student at [employer] with pmh of IBS and pph of OCD, ASD, and MDD with 1 prior IPP admission presenting for follow up.  \par \par Patient following in clinic for management of OCD symptoms, with remission of depressive symptoms. Patient continues to have intrusive thoughts of "going to hell" associated with compulsions. Tolerating current medication regimen with some improvement in symptoms. Discussed health management and writer reviewed the risks and benefits of current treatment/medication and discussed alternatives.  Pt expressed informed understanding.  Despite the risks associated with taking a high dose of SSRI, considering the patient's debilitating level of OCD symptoms (at baseline), the benefits outweigh those risks; furthermore the pt is understanding of the risks and potential adverse effects and has elected to continue current treatment plan. \par \par - Continue with Zoloft 250mg daily for OCD \par - RTC in 6-8 weeks \par - Writer advised patient to obtain ECG and and labs done and have results faxed to the writer.  He expressed agreement.

## 2022-12-15 NOTE — PAST MEDICAL HISTORY
[FreeTextEntry1] : PPH: 1IPP admission in 2018 for OCD, No SA/NSSIB\par Past meds: hydroxyzine\par Substance: denies\par Social: single, never dated, interested in women, lives with mom and brother, has yoselin tristan, graduated hs. attending [employer] unemployed, enjoys reading and video games (3-4hrs/day). Restorationism.\par \par IBS, ASD

## 2022-12-15 NOTE — HISTORY OF PRESENT ILLNESS
[de-identified] : Kan was assessed by [employer].  Kan was located at home at [address], [facility] [address].  Writer is located at [address] [facility] [address].  Pt consented to the use of telehealth for this visit.\par \par Kan endorses anxiety is still well controlled.  He continues to endorse compulsions of licking the wall but is only engaging in this about 2x per week.  He attributes the decrease in compulsions to his medications that keep his anxiety at bay.  He states that the compulsion is easier to resist when he's in public and knows that it's inappropriate behavior.  He states that while at home it's somewhat difficult to resist and usually the behavior happens before he is even able to think about resisting, however he is using coping skills and mindfulness to resist compulsion and ease his anxiety.  He continues to endorse obsessional thoughts that if compulsions are not given into, he will "go to hell."  Overall the compulsions and intrusive thoughts do not cause significant disruption or emotional disturbance.  \par \par Patient reflects on the improvement in his symptoms and the freedom it has given him to enjoy social activities and school, as well as significant improvement in his overall function.  The improvement in his anxiety and OCD symptoms has mad a significant impact on his life, e.g. being able to attend school.\par \par Writer focused the session on coping skills/mindfulness, anxiety reduction skills.   \par \par He is compliant with sertraline and denies side effects. Session focused on supporting Kan in his short term and long term goals.  He denied any acute concerns.  Denies depressive symptoms, psychotic symptoms, manic symptoms.  Denies suicidal ideations or passive death wish or NSSIB.  \par \par Pt does not have a PCP and has not had recently labs or ECG done.  He agrees to have both done soon; writer will mail Rx for these to the pt.   [No] : no [Home] : at home, [unfilled] , at the time of the visit. [Medical Office: ([facility])___] : at the medical office located in  [Verbal consent obtained from patient] : the patient, [unfilled]

## 2023-02-02 ENCOUNTER — OUTPATIENT (OUTPATIENT)
Dept: OUTPATIENT SERVICES | Facility: HOSPITAL | Age: 23
LOS: 1 days | Discharge: HOME | End: 2023-02-02

## 2023-02-02 ENCOUNTER — APPOINTMENT (OUTPATIENT)
Dept: PSYCHIATRY | Facility: CLINIC | Age: 23
End: 2023-02-02
Payer: COMMERCIAL

## 2023-02-02 DIAGNOSIS — F42.2 MIXED OBSESSIONAL THOUGHTS AND ACTS: ICD-10-CM

## 2023-02-02 PROCEDURE — 99213 OFFICE O/P EST LOW 20 MIN: CPT | Mod: NC,95

## 2023-02-02 NOTE — PLAN
[FreeTextEntry4] : remission of OCD symptoms, building coping skills and distraction tools for dealing with intrusive thoughts and compulsions [FreeTextEntry5] : continue zoloft, followup in 2 months

## 2023-02-02 NOTE — PHYSICAL EXAM
[FreeTextEntry2] : unable to assess via telehealth [FreeTextEntry3] : unable to assess via telehealth [FreeTextEntry4] : unable to assess via telehealth [FreeTextEntry5] : somewhat monotonous [FreeTextEntry6] : concrete [de-identified] : not assessed

## 2023-02-02 NOTE — DISCUSSION/SUMMARY
[FreeTextEntry1] : Kan Tellez is a 21-year-old, male, single, domiciled with mom and brother, unemployed, student at [employer] with pmh of IBS and pph of OCD, ASD, and MDD with 1 prior IPP admission presenting for follow up.  \par \par Patient following in clinic for management of OCD symptoms, with remission of depressive symptoms. Patient continues to have intrusive thoughts of "going to hell" associated with compulsions. Tolerating current medication regimen with some improvement in symptoms. Discussed health management and writer reviewed the risks and benefits of current treatment/medication and discussed alternatives.  Pt expressed informed understanding.  Despite the risks associated with taking a high dose of SSRI, considering the patient's debilitating level of OCD symptoms (at baseline), the benefits outweigh those risks; furthermore the pt is understanding of the risks and potential adverse effects and has elected to continue current treatment plan. \par \par - Continue with Zoloft 250mg daily for OCD \par - RTC in 2 months \par - Writer advised patient to obtain ECG and and labs done and have results faxed to the writer.  He expressed agreement.

## 2023-02-02 NOTE — PAST MEDICAL HISTORY
[FreeTextEntry1] : PPH: 1IPP admission in 2018 for OCD, No SA/NSSIB\par Past meds: hydroxyzine\par Substance: denies\par Social: single, never dated, interested in women, lives with mom and brother, has yoselin tristan, graduated hs. attending [employer] unemployed, enjoys reading and video games (3-4hrs/day). Rastafari.\par \par IBS, ASD

## 2023-02-02 NOTE — CURRENT PSYCHIATRIC SYMPTOMS
[Depressed Mood] : no depressed mood [Anhedonia] : no anhedonia [Insomnia] : no insomnia disorder [Euphoria] : no euphoria [Highly Irritable] : no high irritability [Grandeur] : no feelings of grandeur [Excessive Worry] : no excessive worries 7

## 2023-02-02 NOTE — HISTORY OF PRESENT ILLNESS
[de-identified] : Kan endorses anxiety is still well controlled.  He continues to endorse compulsions of licking the wall but is only engaging in this about 2x per week.  He attributes the decrease in compulsions to his medications that keep his anxiety at bay.  He states that the compulsion is easier to resist when he's in public and knows that it's inappropriate behavior.  He states that while at home it's somewhat difficult to resist and usually the behavior happens before he is even able to think about resisting, however he is using coping skills and mindfulness to resist compulsion and ease his anxiety.  He continues to endorse obsessional thoughts that if compulsions are not given into, he will "go to hell."  Overall the compulsions and intrusive thoughts do not cause significant disruption or emotional disturbance.  \par \par Patient reflects on the improvement in his symptoms and the freedom it has given him to enjoy social activities and school, as well as significant improvement in his overall function.  The improvement in his anxiety and OCD symptoms has mad a significant impact on his life, e.g. being able to attend school.\par \par Writer focused the session on coping skills/mindfulness, anxiety reduction skills.   \par \par He is compliant with sertraline and denies side effects. Session focused on supporting Kan in his short term and long term goals.  He denied any acute concerns.  Denies depressive symptoms, psychotic symptoms, manic symptoms.  Denies suicidal ideations or passive death wish or NSSIB.  \par \par Pt does not have a PCP and has not had recently labs or ECG done.  He agrees to have both done soon; writer will mail Rx for these to the pt.

## 2023-03-05 NOTE — BEHAVIORAL HEALTH ASSESSMENT NOTE - MOOD
Within functional limits mildly prolonged mastication but functional/Delayed oral transit time Anxious/Depressed

## 2023-04-13 ENCOUNTER — OUTPATIENT (OUTPATIENT)
Dept: OUTPATIENT SERVICES | Facility: HOSPITAL | Age: 23
LOS: 1 days | End: 2023-04-13
Payer: COMMERCIAL

## 2023-04-13 ENCOUNTER — APPOINTMENT (OUTPATIENT)
Dept: PSYCHIATRY | Facility: CLINIC | Age: 23
End: 2023-04-13

## 2023-04-13 ENCOUNTER — APPOINTMENT (OUTPATIENT)
Dept: PSYCHIATRY | Facility: CLINIC | Age: 23
End: 2023-04-13
Payer: COMMERCIAL

## 2023-04-13 DIAGNOSIS — F42.2 MIXED OBSESSIONAL THOUGHTS AND ACTS: ICD-10-CM

## 2023-04-13 DIAGNOSIS — F19.10 OTHER PSYCHOACTIVE SUBSTANCE ABUSE, UNCOMPLICATED: ICD-10-CM

## 2023-04-13 PROCEDURE — ZZZZZ: CPT

## 2023-04-13 PROCEDURE — 99213 OFFICE O/P EST LOW 20 MIN: CPT | Mod: 95

## 2023-04-13 NOTE — DISCUSSION/SUMMARY
[FreeTextEntry1] : Kan Tellez is a 21-year-old, male, single, domiciled with mom and brother, unemployed, student at [employer] with pmh of IBS and pph of OCD, ASD, and MDD with 1 prior IPP admission presenting for follow up.  \par \par Patient following in clinic for management of OCD symptoms, with remission of depressive symptoms. Patient continues to have intrusive thoughts of "going to hell" associated with compulsions. Tolerating current medication regimen with some improvement in symptoms. Discussed health management and writer reviewed the risks and benefits of current treatment/medication and discussed alternatives.  Pt expressed informed understanding.  Despite the risks associated with taking a high dose of SSRI, considering the patient's debilitating level of OCD symptoms (at baseline), the benefits outweigh those risks; furthermore the pt is understanding of the risks and potential adverse effects and has elected to continue current treatment plan. \par \par - Continue with Zoloft 250mg daily for OCD \par - RTC in 2 months \par - Writer advised patient to obtain ECG and and labs done and have results faxed to the writer.  He expressed agreement.

## 2023-04-13 NOTE — CURRENT PSYCHIATRIC SYMPTOMS
[Depressed Mood] : no depressed mood [Anhedonia] : no anhedonia [Insomnia] : no insomnia disorder [Euphoria] : no euphoria [Highly Irritable] : no high irritability [Grandeur] : no feelings of grandeur [Excessive Worry] : no excessive worries

## 2023-04-13 NOTE — HISTORY OF PRESENT ILLNESS
[de-identified] : Kan endorses anxiety is still well controlled.  He continues to endorse compulsions of licking the wall but is only engaging in this about 2x per week.  He attributes the decrease in compulsions to his medications that keep his anxiety at bay.  He states that the compulsion is easier to resist when he's in public and knows that it's inappropriate behavior.  He states that while at home it's somewhat difficult to resist and usually the behavior happens before he is even able to think about resisting, however he is using coping skills and mindfulness to resist compulsion and ease his anxiety.  He continues to endorse obsessional thoughts that if compulsions are not given into, he will "go to hell."  Overall the compulsions and intrusive thoughts do not cause significant disruption or emotional disturbance.  \par \par Patient reflects on the improvement in his symptoms and the freedom it has given him to enjoy social activities and school, as well as significant improvement in his overall function.  The improvement in his anxiety and OCD symptoms has mad a significant impact on his life, e.g. being able to attend school.\par \par Writer focused the session on coping skills/mindfulness, anxiety reduction skills.   \par \par He is compliant with sertraline and denies side effects. Session focused on supporting Kan in his short term and long term goals.  He denied any acute concerns.  Denies depressive symptoms, psychotic symptoms, manic symptoms.  Denies suicidal ideations or passive death wish or NSSIB.  \par \par Pt does not have a PCP and has not had recently labs or ECG done.  He agrees to have both done soon; writer will mail Rx for these to the pt.

## 2023-04-13 NOTE — PHYSICAL EXAM
[FreeTextEntry2] : unable to assess via telehealth [FreeTextEntry3] : unable to assess via telehealth [FreeTextEntry4] : unable to assess via telehealth [FreeTextEntry5] : somewhat monotonous [FreeTextEntry6] : concrete [de-identified] : not assessed

## 2023-04-13 NOTE — PAST MEDICAL HISTORY
[FreeTextEntry1] : PPH: 1IPP admission in 2018 for OCD, No SA/NSSIB\par Past meds: hydroxyzine\par Substance: denies\par Social: single, never dated, interested in women, lives with mom and brother, has yoselin tristan, graduated hs. attending [employer] unemployed, enjoys reading and video games (3-4hrs/day). Orthodox.\par \par IBS, ASD

## 2023-04-14 DIAGNOSIS — F42.2 MIXED OBSESSIONAL THOUGHTS AND ACTS: ICD-10-CM

## 2023-06-05 ENCOUNTER — APPOINTMENT (OUTPATIENT)
Dept: PSYCHIATRY | Facility: CLINIC | Age: 23
End: 2023-06-05

## 2023-07-12 ENCOUNTER — APPOINTMENT (OUTPATIENT)
Dept: PSYCHIATRY | Facility: CLINIC | Age: 23
End: 2023-07-12
Payer: COMMERCIAL

## 2023-07-12 ENCOUNTER — OUTPATIENT (OUTPATIENT)
Dept: OUTPATIENT SERVICES | Facility: HOSPITAL | Age: 23
LOS: 1 days | End: 2023-07-12
Payer: COMMERCIAL

## 2023-07-12 DIAGNOSIS — F33.1 MAJOR DEPRESSIVE DISORDER, RECURRENT, MODERATE: ICD-10-CM

## 2023-07-12 PROCEDURE — ZZZZZ: CPT

## 2023-07-12 PROCEDURE — 99213 OFFICE O/P EST LOW 20 MIN: CPT | Mod: 95

## 2023-07-12 NOTE — CURRENT PSYCHIATRIC SYMPTOMS
[Obsessions] : obsessions [Depressed Mood] : no depressed mood [Anhedonia] : no anhedonia [Insomnia] : no insomnia disorder [Euphoria] : no euphoria [Highly Irritable] : no high irritability [Grandeur] : no feelings of grandeur [Excessive Worry] : no excessive worries

## 2023-07-12 NOTE — DISCUSSION/SUMMARY
[FreeTextEntry1] : Kan Telelz is a 21-year-old, male, single, domiciled with mom and brother, unemployed, student at [employer] with pmh of IBS and pph of OCD, ASD, and MDD with 1 prior IPP admission presenting for follow up.  \par \par Patient following in clinic for management of OCD symptoms, with remission of depressive symptoms. Patient continues to have intrusive thoughts of "going to hell" associated with compulsions. Tolerating current medication regimen with some improvement in symptoms. Discussed health management and writer reviewed the risks and benefits of current treatment/medication and discussed alternatives.  Pt expressed informed understanding.  Despite the risks associated with taking a high dose of SSRI, considering the patient's debilitating level of OCD symptoms (at baseline), the benefits outweigh those risks; furthermore the pt is understanding of the risks and potential adverse effects and has elected to continue current treatment plan. \par \par - Continue with Zoloft 250mg daily for OCD \par - RTC in 2 months \par - Writer advised patient to obtain ECG and and labs done and have results faxed to the writer.  He expressed agreement.

## 2023-07-12 NOTE — HISTORY OF PRESENT ILLNESS
[No] : no [Home] : at home, [unfilled] , at the time of the visit. [Medical Office: ([facility])___] : at the medical office located in  [Verbal consent obtained from patient] : the patient, [unfilled] [FreeTextEntry1] : Patient assessed briefly over telehealth without audio due to technical difficulties, switched to telephone only for rest of interview. \par \par Patient reports that he has been doing well on the Zoloft 250 mg, denies side effects. He is between semesters at school, not sure how many he has left, wants to study communications. He reports sleeping 8 hours with normal appetite, denies any issues with focus. He reports no issues with family, lives with mother and brother. He reports compulsions have been relatively stable, has been experiencing them 1-2 times a week, sometimes up to 5 times a week. They are accompanied by intrusive thoughts about going to hell. Patient reports that he does not want to start therapy at this time as he feels that he is doing pretty good with exposure-response techniques where he lets his anxiety wash over him instead of giving in to compulsion. Patient denies SI/HI/AVH.\par \par He reports that he has had issues with telehealth before and that he will try to set up his computer with a webcam prior to next appointment.  [FreeTextEntry2] : 1 prior IPP admission in 2018

## 2023-07-12 NOTE — REASON FOR VISIT
[Patient preference] : as per patient preference [Continuing, patient not seen in-person within last 12 months (provide details below)] : Telehealth services are continuing, patient not seen in-person within last 12 months.  [Telephone (audio) - Individual/Group] : This telephonic visit was provided via audio only technology. [Technical or other issues] : Patient unable to effectively utilize tele-video due to technical or other issues. [This encounter was initiated by telehealth (audio with video) and converted to telephone (audio only) due to technical difficulties.] : This encounter was initiated by telehealth (audio with video) and converted to telephone (audio only) due to technical difficulties. [Medical Office: ([facility])___] : The provider was located at the medical office in [unfilled]. [Home] : The patient, [unfilled], was located at home, [unfilled], at the time of the visit. [Verbal consent obtained from patient/other participant(s)] : Verbal consent for telehealth/telephonic services obtained from patient/other participant(s) [Patient] : Patient [Follow-Up Visit] : a follow-up visit [FreeTextEntry4] : 12:50 [FreeTextEntry5] : 1:10 [FreeTextEntry1] : "I'm doing pretty good"

## 2023-07-12 NOTE — PLAN
[Medication education provided] : Medication education provided. [Rationale for medication choices, possible risks/precautions, benefits, alternative treatment choices, and consequences of non-treatment discussed] : Rationale for medication choices, possible risks/precautions, benefits, alternative treatment choices, and consequences of non-treatment discussed with patient/family/caregiver  [FreeTextEntry4] : remission of OCD symptoms, building coping skills and distraction tools for dealing with intrusive thoughts and compulsions [FreeTextEntry5] : continue zoloft, followup in 2 months

## 2023-07-12 NOTE — PHYSICAL EXAM
[Cooperative] : cooperative [Euthymic] : euthymic [Clear] : clear [Linear/Goal Directed] : linear/goal directed [None] : none [None Reported] : none reported [Average] : average [WNL] : within normal limits [Obsessions] : obsessions [Constricted] : constricted [Normal] : normal [Adeq for basic needs] : adequate for basic needs [FreeTextEntry2] : unable to assess via telehealth [FreeTextEntry3] : unable to assess via telehealth [FreeTextEntry4] : unable to assess via telehealth [FreeTextEntry1] : casually dressed [de-identified] : laconic, reduced prosody [FreeTextEntry5] : reduced prosody [FreeTextEntry6] : concrete [de-identified] : not assessed

## 2023-07-13 DIAGNOSIS — F33.1 MAJOR DEPRESSIVE DISORDER, RECURRENT, MODERATE: ICD-10-CM

## 2023-08-23 ENCOUNTER — APPOINTMENT (OUTPATIENT)
Dept: PSYCHIATRY | Facility: CLINIC | Age: 23
End: 2023-08-23
Payer: COMMERCIAL

## 2023-08-23 ENCOUNTER — OUTPATIENT (OUTPATIENT)
Dept: OUTPATIENT SERVICES | Facility: HOSPITAL | Age: 23
LOS: 1 days | End: 2023-08-23
Payer: COMMERCIAL

## 2023-08-23 DIAGNOSIS — F33.1 MAJOR DEPRESSIVE DISORDER, RECURRENT, MODERATE: ICD-10-CM

## 2023-08-23 DIAGNOSIS — F42.2 MIXED OBSESSIONAL THOUGHTS AND ACTS: ICD-10-CM

## 2023-08-23 PROCEDURE — ZZZZZ: CPT

## 2023-08-23 PROCEDURE — 99213 OFFICE O/P EST LOW 20 MIN: CPT | Mod: 95

## 2023-08-23 NOTE — PAST MEDICAL HISTORY
[FreeTextEntry1] : PPH: 1IPP admission in 2018 for OCD, No SA/NSSIB\par  Past meds: hydroxyzine\par  Substance: denies\par  Social: single, never dated, interested in women, lives with mom and brother, has yoselin tristan, graduated hs. attending [employer] unemployed, enjoys reading and video games (3-4hrs/day). Holiness.\par  \par  IBS, ASD

## 2023-08-23 NOTE — PHYSICAL EXAM
[Cooperative] : cooperative [Euthymic] : euthymic [Constricted] : constricted [Clear] : clear [Linear/Goal Directed] : linear/goal directed [None Reported] : none reported [Average] : average [WNL] : within normal limits [FreeTextEntry2] : unable to assess via telehealth [FreeTextEntry3] : unable to assess via telehealth [FreeTextEntry4] : unable to assess via telehealth [Obsessional] : obsessional [FreeTextEntry1] : casually dressed [FreeTextEntry7] : reports continued obsessive thoughts about once a day, however reports they are manageable [FreeTextEntry5] : reduced prosody [FreeTextEntry6] : concrete

## 2023-08-23 NOTE — HISTORY OF PRESENT ILLNESS
[No] : no [Home] : at home, [unfilled] , at the time of the visit. [Medical Office: ([facility])___] : at the medical office located in  [Verbal consent obtained from patient] : the patient, [unfilled] [FreeTextEntry1] : Patient assessed briefly over telehealth without audio due to technical difficulties, switched to telehealth on computer without functioning video, so audio only. Patient informed that if future appointments lack both video and audio capability then he will have to come in person. Patient reports understanding.  Patient reports that he has been doing well on the Zoloft 250 mg, denies side effects. Reports that school is going to start on the 28th, endorses feeling a little bit anxious but overall fine about it. Reports that his compulsions have been infrequent and fairly manageable, about once a day and 90% of the time able to resist them. He reports sleeping 8 hours with normal appetite, denies any issues with focus. Patient reports that he does not want to start therapy at this time as he feels that he is doing pretty good with exposure-response techniques where he lets his anxiety wash over him instead of giving in to compulsion. No SI/HI/AVH elicited.  Patient reports that he has a PCP but hasn't gotten EKG. Patient agrees to get EKG from PCP and fax over to clinic -- fax number given. [FreeTextEntry2] : 1 prior IPP admission in 2018

## 2023-08-23 NOTE — DISCUSSION/SUMMARY
[FreeTextEntry1] : Kan Tellez is a 23-year-old, male, single, domiciled with mom and brother, unemployed, student at [employer] with pmh of IBS and pph of OCD, ASD, and MDD with 1 prior IPP admission presenting for follow up.    Patient following in clinic for management of OCD symptoms, with remission of depressive symptoms. Patient continues to have intrusive thoughts of "going to hell" associated with compulsions. Tolerating current medication regimen with some improvement in symptoms. Discussed health management and writer reviewed the risks and benefits of current treatment/medication and discussed alternatives.  Pt expressed informed understanding.  Despite the risks associated with taking a high dose of SSRI, considering the patient's debilitating level of OCD symptoms (at baseline), the benefits outweigh those risks; furthermore the pt is understanding of the risks and potential adverse effects and has elected to continue current treatment plan.   - Continue with Zoloft 250mg daily for OCD  - patient informed that if he doesn't have functioning audio and video equipment at next visit, he will need to come in person; he verbalizes understanding - RTC in 2 months  - Writer advised patient to obtain ECG and and labs done and have results faxed to the writer.  He expressed agreement.

## 2023-08-23 NOTE — REASON FOR VISIT
[Patient preference] : as per patient preference [Continuing, patient not seen in-person within last 12 months (provide details below)] : Telehealth services are continuing, patient not seen in-person within last 12 months.  [Telephone (audio) - Individual/Group] : This telephonic visit was provided via audio only technology. [Technical or other issues] : Patient unable to effectively utilize tele-video due to technical or other issues. [This encounter was initiated by telehealth (audio with video) and converted to telephone (audio only) due to technical difficulties.] : This encounter was initiated by telehealth (audio with video) and converted to telephone (audio only) due to technical difficulties. [Medical Office: ([facility])___] : The provider was located at the medical office in [unfilled]. [Home] : The patient, [unfilled], was located at home, [unfilled], at the time of the visit. [Verbal consent obtained from patient/other participant(s)] : Verbal consent for telehealth/telephonic services obtained from patient/other participant(s) [Patient] : Patient [Follow-Up Visit] : a follow-up visit [FreeTextEntry1] : "I'm doing pretty good"

## 2023-08-24 DIAGNOSIS — F42.2 MIXED OBSESSIONAL THOUGHTS AND ACTS: ICD-10-CM

## 2023-10-19 ENCOUNTER — APPOINTMENT (OUTPATIENT)
Dept: PSYCHIATRY | Facility: CLINIC | Age: 23
End: 2023-10-19
Payer: COMMERCIAL

## 2023-10-19 ENCOUNTER — OUTPATIENT (OUTPATIENT)
Dept: OUTPATIENT SERVICES | Facility: HOSPITAL | Age: 23
LOS: 1 days | End: 2023-10-19
Payer: COMMERCIAL

## 2023-10-19 DIAGNOSIS — F33.1 MAJOR DEPRESSIVE DISORDER, RECURRENT, MODERATE: ICD-10-CM

## 2023-10-19 DIAGNOSIS — F42.2 MIXED OBSESSIONAL THOUGHTS AND ACTS: ICD-10-CM

## 2023-10-19 PROCEDURE — ZZZZZ: CPT

## 2023-10-19 PROCEDURE — 99213 OFFICE O/P EST LOW 20 MIN: CPT | Mod: 95

## 2023-10-20 DIAGNOSIS — F42.2 MIXED OBSESSIONAL THOUGHTS AND ACTS: ICD-10-CM

## 2023-12-14 ENCOUNTER — OUTPATIENT (OUTPATIENT)
Dept: OUTPATIENT SERVICES | Facility: HOSPITAL | Age: 23
LOS: 1 days | End: 2023-12-14
Payer: COMMERCIAL

## 2023-12-14 ENCOUNTER — APPOINTMENT (OUTPATIENT)
Dept: PSYCHIATRY | Facility: CLINIC | Age: 23
End: 2023-12-14
Payer: COMMERCIAL

## 2023-12-14 DIAGNOSIS — F33.1 MAJOR DEPRESSIVE DISORDER, RECURRENT, MODERATE: ICD-10-CM

## 2023-12-14 PROCEDURE — ZZZZZ: CPT

## 2023-12-14 PROCEDURE — 99213 OFFICE O/P EST LOW 20 MIN: CPT | Mod: 95

## 2023-12-14 NOTE — PLAN
[Medication education provided] : Medication education provided. [Rationale for medication choices, possible risks/precautions, benefits, alternative treatment choices, and consequences of non-treatment discussed] : Rationale for medication choices, possible risks/precautions, benefits, alternative treatment choices, and consequences of non-treatment discussed with patient/family/caregiver  [FreeTextEntry4] : remission of OCD symptoms, building coping skills and distraction tools for dealing with intrusive thoughts and compulsions [FreeTextEntry5] : - Continue with Zoloft 250mg daily for OCD  - start melatoning 3 mg PRN for sleep - RTC in 2 months  - Writer advised patient to obtain ECG and and labs done and have results faxed to the writer.  He expressed agreement.

## 2023-12-14 NOTE — HISTORY OF PRESENT ILLNESS
[FreeTextEntry1] : Patient assessed over telehealth platform.  Patient reports that he has been doing well on the Zoloft 250 mg, denies side effects. Reports that school is going well, is nearing the end of the semester, has been doing well academically. He endorses "very stable" mood and getting 6-7 hours of sleep a night, reports going to bed at 2 AM and getting up around noon for afternoon class. Reports that his compulsions have been infrequent and fairly manageable, about once a day and 90% of the time able to resist them.   Patient reports that he does not want to start therapy at this time as he feels that he is doing pretty good with exposure-response techniques where he lets his anxiety wash over him instead of giving in to compulsion. No SI/HI/AVH elicited.  [FreeTextEntry2] : 1 prior IPP admission in 2018 [No] : no [Home] : at home, [unfilled] , at the time of the visit. [Medical Office: ([facility])___] : at the medical office located in  [Verbal consent obtained from patient] : the patient, [unfilled]

## 2023-12-14 NOTE — PAST MEDICAL HISTORY
[FreeTextEntry1] : PPH: 1IPP admission in 2018 for OCD, No SA/NSSIB\par  Past meds: hydroxyzine\par  Substance: denies\par  Social: single, never dated, interested in women, lives with mom and brother, has yoselin tristan, graduated hs. attending [employer] unemployed, enjoys reading and video games (3-4hrs/day). Roman [employer].\par  \par  IBS, ASD

## 2023-12-14 NOTE — DISCUSSION/SUMMARY
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    Patient following in clinic for management of OCD symptoms, with remission of depressive symptoms. Patient continues to have intrusive thoughts of "going to hell" associated with compulsions. Tolerating current medication regimen with improvement in symptoms, currently stable and manageable. Discussed health management and writer reviewed the risks and benefits of current treatment/medication and discussed alternatives.  Pt expressed informed understanding.  Despite the risks associated with taking a high dose of SSRI, considering the patient's debilitating level of OCD symptoms (at baseline), the benefits outweigh those risks; furthermore the pt is understanding of the risks and potential adverse effects and has elected to continue current treatment plan.

## 2023-12-14 NOTE — PHYSICAL EXAM
[FreeTextEntry2] : unable to assess via telehealth [FreeTextEntry3] : unable to assess via telehealth [FreeTextEntry4] : unable to assess via telehealth [Cooperative] : cooperative [Euthymic] : euthymic [Constricted] : constricted [Clear] : clear [Linear/Goal Directed] : linear/goal directed [[place]] : concrete [Obsessional] : obsessional [None Reported] : none reported [Average] : average [WNL] : within normal limits [FreeTextEntry1] : casually dressed [FreeTextEntry7] : reports continued obsessive thoughts about once a day, however reports they are manageable

## 2023-12-15 DIAGNOSIS — F33.1 MAJOR DEPRESSIVE DISORDER, RECURRENT, MODERATE: ICD-10-CM

## 2024-02-06 ENCOUNTER — OUTPATIENT (OUTPATIENT)
Dept: OUTPATIENT SERVICES | Facility: HOSPITAL | Age: 24
LOS: 1 days | End: 2024-02-06
Payer: COMMERCIAL

## 2024-02-06 ENCOUNTER — APPOINTMENT (OUTPATIENT)
Dept: PSYCHIATRY | Facility: CLINIC | Age: 24
End: 2024-02-06
Payer: COMMERCIAL

## 2024-02-06 DIAGNOSIS — G47.00 INSOMNIA, UNSPECIFIED: ICD-10-CM

## 2024-02-06 DIAGNOSIS — F33.1 MAJOR DEPRESSIVE DISORDER, RECURRENT, MODERATE: ICD-10-CM

## 2024-02-06 DIAGNOSIS — F42.2 MIXED OBSESSIONAL THOUGHTS AND ACTS: ICD-10-CM

## 2024-02-06 PROCEDURE — ZZZZZ: CPT

## 2024-02-06 PROCEDURE — 99214 OFFICE O/P EST MOD 30 MIN: CPT | Mod: 95

## 2024-02-07 DIAGNOSIS — G47.00 INSOMNIA, UNSPECIFIED: ICD-10-CM

## 2024-02-07 DIAGNOSIS — F42.2 MIXED OBSESSIONAL THOUGHTS AND ACTS: ICD-10-CM

## 2024-02-09 NOTE — PHYSICAL EXAM
[FreeTextEntry2] : unable to assess via telehealth [FreeTextEntry3] : unable to assess via telehealth [FreeTextEntry4] : unable to assess via telehealth [Cooperative] : cooperative [Euthymic] : euthymic [Constricted] : constricted [Clear] : clear [Linear/Goal Directed] : linear/goal directed [[place]] : concrete [Obsessional] : obsessional [None Reported] : none reported [Average] : average [WNL] : within normal limits [FreeTextEntry1] : casually dressed; bedroom appears messy in the background [FreeTextEntry7] : reports continued obsessive thoughts about once a day, however reports they are manageable

## 2024-02-09 NOTE — REASON FOR VISIT
[Patient preference] : as per patient preference [Continuing, patient not seen in-person within last 12 months (provide details below)] : Telehealth services are continuing, patient not seen in-person within last 12 months.  [Telehealth (audio & video) - Individual/Group] : This visit was provided via telehealth using real-time 2-way audio visual technology. [Medical Office: ([facility])___] : The provider was located at the medical office in [unfilled]. [Home] : The patient, [unfilled], was located at home, [unfilled], at the time of the visit. [Verbal consent obtained from patient/other participant(s)] : Verbal consent for telehealth/telephonic services obtained from patient/other participant(s) [Patient] : Patient [Follow-Up Visit] : a follow-up visit [FreeTextEntry1] : medication management

## 2024-02-09 NOTE — PLAN
[Medication education provided] : Medication education provided. [Rationale for medication choices, possible risks/precautions, benefits, alternative treatment choices, and consequences of non-treatment discussed] : Rationale for medication choices, possible risks/precautions, benefits, alternative treatment choices, and consequences of non-treatment discussed with patient/family/caregiver  [FreeTextEntry4] : remission of OCD symptoms, building coping skills and distraction tools for dealing with intrusive thoughts and compulsions [FreeTextEntry5] : - Continue with Zoloft 250mg daily for OCD  - start melatonin 3 mg PRN for sleep - RTC in 3 weeks  - Writer advised patient to obtain ECG and and labs done and have results faxed to the writer.  He expressed agreement.

## 2024-02-09 NOTE — PAST MEDICAL HISTORY
[FreeTextEntry1] : PPH: 1IPP admission in 2018 for OCD, No SA/NSSIB\par  Past meds: hydroxyzine\par  Substance: denies\par  Social: single, never dated, interested in women, lives with mom and brother, has yoselin tristan, graduated hs. attending [employer] unemployed, enjoys reading and video games (3-4hrs/day). Anabaptist.\par  \par  IBS, ASD

## 2024-02-09 NOTE — DISCUSSION/SUMMARY
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    Patient following in clinic for management of OCD symptoms, with remission of depressive symptoms. Patient continues to have intrusive thoughts of "going to hell" associated with compulsions. Tolerating current medication regimen with improvement in symptoms, currently stable and manageable. Discussed health management and writer reviewed the risks and benefits of current treatment/medication and discussed alternatives.  Pt expressed informed understanding.  Despite the risks associated with taking a high dose of SSRI, considering the patient's debilitating level of OCD symptoms (at baseline), the benefits outweigh those risks; furthermore the pt is understanding of the risks and potential adverse effects and has elected to continue current treatment plan.   Patient reports symptoms of inattention and procrastination that is disrupting his ability to turn in all his assignments in college. Agrees to get labwork and EKG prior to initiating any new treatments. Will discuss risk for exacerbation of OCD symptoms with activating medications.

## 2024-02-09 NOTE — HISTORY OF PRESENT ILLNESS
[FreeTextEntry1] : Patient assessed over telehealth platform.  Patient reports that he has been doing well on the Zoloft 250 mg, denies side effects. Reports that school is going well, is nearing the end of the semester, has been doing well academically. He endorses "very stable" mood and getting 7 hours of sleep a nigh. Reports that his compulsions have been infrequent and fairly manageable, about once a day and 90% of the time able to resist them.   Patient reports that he does not want to start therapy at this time as he feels that he is doing pretty good with exposure-response techniques where he lets his anxiety wash over him instead of giving in to compulsion. No SI/HI/AVH elicited.  Patient reports that his mom thinks he has ADHD and wishes to be evaluated for it. He reports that his grades in college have been suffering as he has not been getting all his work in. He denies cannabis, alcohol, or any other drugs besides soda with caffeine. He scores highly on inattentive symptoms for adult ADHD screener. He agrees to  requisitions for labwork and EKG prior to initiating treatment for attentional symptoms.  [FreeTextEntry2] : 1 prior IPP admission in 2018 [No] : no

## 2024-02-21 ENCOUNTER — OUTPATIENT (OUTPATIENT)
Dept: OUTPATIENT SERVICES | Facility: HOSPITAL | Age: 24
LOS: 1 days | End: 2024-02-21
Payer: COMMERCIAL

## 2024-02-21 DIAGNOSIS — R94.31 ABNORMAL ELECTROCARDIOGRAM [ECG] [EKG]: ICD-10-CM

## 2024-02-21 PROCEDURE — 93010 ELECTROCARDIOGRAM REPORT: CPT

## 2024-02-21 PROCEDURE — 93005 ELECTROCARDIOGRAM TRACING: CPT

## 2024-02-22 DIAGNOSIS — R94.31 ABNORMAL ELECTROCARDIOGRAM [ECG] [EKG]: ICD-10-CM

## 2024-02-27 ENCOUNTER — APPOINTMENT (OUTPATIENT)
Dept: PSYCHIATRY | Facility: CLINIC | Age: 24
End: 2024-02-27
Payer: COMMERCIAL

## 2024-02-27 ENCOUNTER — OUTPATIENT (OUTPATIENT)
Dept: OUTPATIENT SERVICES | Facility: HOSPITAL | Age: 24
LOS: 1 days | End: 2024-02-27
Payer: COMMERCIAL

## 2024-02-27 DIAGNOSIS — F33.1 MAJOR DEPRESSIVE DISORDER, RECURRENT, MODERATE: ICD-10-CM

## 2024-02-27 DIAGNOSIS — F42.2 MIXED OBSESSIONAL THOUGHTS AND ACTS: ICD-10-CM

## 2024-02-27 DIAGNOSIS — R41.840 ATTENTION AND CONCENTRATION DEFICIT: ICD-10-CM

## 2024-02-27 PROCEDURE — ZZZZZ: CPT

## 2024-02-27 PROCEDURE — 99213 OFFICE O/P EST LOW 20 MIN: CPT | Mod: 95

## 2024-02-27 NOTE — PHYSICAL EXAM
[FreeTextEntry2] : unable to assess via telehealth [FreeTextEntry3] : unable to assess via telehealth [FreeTextEntry4] : unable to assess via telehealth [Cooperative] : cooperative [Euthymic] : euthymic [Constricted] : constricted [Clear] : clear [Linear/Goal Directed] : linear/goal directed [[place]] : concrete [Obsessional] : obsessional [None Reported] : none reported [Average] : average [WNL] : within normal limits [de-identified] : constricted at baseline [FreeTextEntry7] : reports continued obsessive thoughts about once a day, however reports they are manageable

## 2024-02-27 NOTE — PAST MEDICAL HISTORY
[FreeTextEntry1] : PPH: 1IPP admission in 2018 for OCD, No SA/NSSIB\par  Past meds: hydroxyzine\par  Substance: denies\par  Social: single, never dated, interested in women, lives with mom and brother, has yoselin tristan, graduated hs. attending [employer] unemployed, enjoys reading and video games (3-4hrs/day). Hinduism.\par  \par  IBS, ASD

## 2024-02-27 NOTE — DISCUSSION/SUMMARY
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    Patient following in clinic for management of OCD symptoms, with remission of depressive symptoms. Patient continues to have intrusive thoughts of "going to hell" associated with compulsions. Tolerating current medication regimen with improvement in symptoms, currently stable and manageable. Discussed health management and writer reviewed the risks and benefits of current treatment/medication and discussed alternatives.  Pt expressed informed understanding.  Despite the risks associated with taking a high dose of SSRI, considering the patient's debilitating level of OCD symptoms (at baseline), the benefits outweigh those risks; furthermore the pt is understanding of the risks and potential adverse effects and has elected to continue current treatment plan.   Patient reports symptoms of inattention and procrastination that is disrupting his ability to turn in all his assignments in college. QTc on 2/21/24 is 390, no labs are seen in the system, patient reports getting them done in a private office and that his mom has the results which she will fax to the office. Patient informed that there is a theoretical risk of ADHD medication exacerbating OCD symptoms, he reports understanding. Will start low dose atomoxetine and monitor for worsening of symptoms. Patient also warned re: potential for zoloft and atomoxetine to produce cardiac effects, instructed to let writer know if he feels palpitations, dizziness, or chest pain. He reports understanding.

## 2024-02-27 NOTE — PLAN
[Medication education provided] : Medication education provided. [Rationale for medication choices, possible risks/precautions, benefits, alternative treatment choices, and consequences of non-treatment discussed] : Rationale for medication choices, possible risks/precautions, benefits, alternative treatment choices, and consequences of non-treatment discussed with patient/family/caregiver  [FreeTextEntry4] : improvement of executive function skills, remission of OCD symptoms, building coping skills and distraction tools for dealing with intrusive thoughts and compulsions [FreeTextEntry5] : - start atomoxetine 25 mg qAM for sxs of inattention - Continue with Zoloft 250mg daily for OCD  - continue melatonin 3 mg PRN for sleep - RTC in 4 weeks

## 2024-02-27 NOTE — HISTORY OF PRESENT ILLNESS
[FreeTextEntry1] : Patient assessed over telehealth platform.  Patient reports that he has gotten the EKG and bloodwork done and is ready to initiate treatment for his attentional issues. QTc on 2/21/24 is 390, no labs are seen in the system, patient reports getting them done in a private office and that his mom has the results which she will fax to the office. Patient informed that there is a theoretical risk of ADHD medication exacerbating OCD symptoms, he reports understanding. Will start low dose atomoxetine and monitor for worsening of symptoms. Patient also warned re: potential for zoloft and atomoxetine to produce cardiac effects, instructed to let writer know if he feels palpitations, dizziness, or chest pain. He reports understanding.  No SI/HI/AVH elicited.  [FreeTextEntry2] : 1 prior IPP admission in 2018 [No] : no

## 2024-02-27 NOTE — REASON FOR VISIT
[Patient preference] : as per patient preference [Continuing, patient not seen in-person within last 12 months (provide details below)] : Telehealth services are continuing, patient not seen in-person within last 12 months.  [Telehealth (audio & video) - Individual/Group] : This visit was provided via telehealth using real-time 2-way audio visual technology. [Medical Office: ([facility])___] : The provider was located at the medical office in [unfilled]. [Home] : The patient, [unfilled], was located at home, [unfilled], at the time of the visit. [Verbal consent obtained from patient/other participant(s)] : Verbal consent for telehealth/telephonic services obtained from patient/other participant(s) [Patient] : Patient [Follow-Up Visit] : a follow-up visit [FreeTextEntry1] : medication management

## 2024-02-28 DIAGNOSIS — F42.2 MIXED OBSESSIONAL THOUGHTS AND ACTS: ICD-10-CM

## 2024-02-28 DIAGNOSIS — R41.840 ATTENTION AND CONCENTRATION DEFICIT: ICD-10-CM

## 2024-03-26 ENCOUNTER — APPOINTMENT (OUTPATIENT)
Dept: PSYCHIATRY | Facility: CLINIC | Age: 24
End: 2024-03-26
Payer: COMMERCIAL

## 2024-03-26 ENCOUNTER — OUTPATIENT (OUTPATIENT)
Dept: OUTPATIENT SERVICES | Facility: HOSPITAL | Age: 24
LOS: 1 days | End: 2024-03-26
Payer: COMMERCIAL

## 2024-03-26 DIAGNOSIS — F33.1 MAJOR DEPRESSIVE DISORDER, RECURRENT, MODERATE: ICD-10-CM

## 2024-03-26 PROCEDURE — ZZZZZ: CPT

## 2024-03-26 NOTE — PHYSICAL EXAM
[FreeTextEntry2] : unable to assess via telehealth [FreeTextEntry3] : unable to assess via telehealth [FreeTextEntry4] : unable to assess via telehealth [Disheveled] : disheveled [Cooperative] : cooperative [Euthymic] : euthymic [Constricted] : constricted [Clear] : clear [Linear/Goal Directed] : linear/goal directed [[place]] : concrete [Obsessional] : obsessional [None Reported] : none reported [Average] : average [WNL] : within normal limits [FreeTextEntry1] : unmade bed/untidy room behind patient [de-identified] : constricted at baseline [FreeTextEntry7] : denies any worsening of obsessive thoughts

## 2024-03-26 NOTE — REASON FOR VISIT
[Patient preference] : as per patient preference [Continuing, patient not seen in-person within last 12 months (provide details below)] : Telehealth services are continuing, patient not seen in-person within last 12 months.  [Telehealth (audio & video) - Individual/Group] : This visit was provided via telehealth using real-time 2-way audio visual technology. [Medical Office: ([facility])___] : The provider was located at the medical office in [unfilled]. [Home] : The patient, [unfilled], was located at home, [unfilled], at the time of the visit. [Verbal consent obtained from patient/other participant(s)] : Verbal consent for telehealth/telephonic services obtained from patient/other participant(s) [Patient] : Patient [Follow-Up Visit] : a follow-up visit [FreeTextEntry1] : medication management

## 2024-03-26 NOTE — HISTORY OF PRESENT ILLNESS
[FreeTextEntry1] : Patient assessed over telehealth platform.  Patient reports that since starting the atomoxetine he hasn't noticed any side effects and has noticed minor improvement in his motivation but he still struggles with procrastination. He denies any worsening of OCD symptoms and denies changes in sleep, appetite, anxiety, or mood. He denies any chest pain or cardiac symptoms. He is amenable to increasing dose to 40 mg.  No SI/HI/AVH elicited.  [FreeTextEntry2] : 1 prior IPP admission in 2018 [No] : no

## 2024-03-26 NOTE — PLAN
[Medication education provided] : Medication education provided. [Rationale for medication choices, possible risks/precautions, benefits, alternative treatment choices, and consequences of non-treatment discussed] : Rationale for medication choices, possible risks/precautions, benefits, alternative treatment choices, and consequences of non-treatment discussed with patient/family/caregiver  [FreeTextEntry4] : improvement of executive function skills, remission of OCD symptoms, building coping skills and distraction tools for dealing with intrusive thoughts and compulsions [FreeTextEntry5] : - incrase atomoxetine to 40 mg qAM for sxs of inattention - Continue with Zoloft 250mg daily for OCD  - continue melatonin 3 mg PRN for sleep - RTC in 5 weeks

## 2024-03-26 NOTE — PAST MEDICAL HISTORY
[FreeTextEntry1] : PPH: 1IPP admission in 2018 for OCD, No SA/NSSIB\par  Past meds: hydroxyzine\par  Substance: denies\par  Social: single, never dated, interested in women, lives with mom and brother, has yoselin tristan, graduated hs. attending [employer] unemployed, enjoys reading and video games (3-4hrs/day). Shinto.\par  \par  IBS, ASD

## 2024-03-26 NOTE — DISCUSSION/SUMMARY
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    Patient following in clinic for management of OCD symptoms, with remission of depressive symptoms. Patient continues to have intrusive thoughts of "going to hell" associated with compulsions. Tolerating current medication regimen with improvement in symptoms, currently stable and manageable. Discussed health management and writer reviewed the risks and benefits of current treatment/medication and discussed alternatives.  Pt expressed informed understanding.  Despite the risks associated with taking a high dose of SSRI, considering the patient's debilitating level of OCD symptoms (at baseline), the benefits outweigh those risks; furthermore the pt is understanding of the risks and potential adverse effects and has elected to continue current treatment plan.   Patient reports symptoms of inattention and procrastination that is disrupting his ability to turn in all his assignments in college. QTc on 2/21/24 is 390, no labs are seen in the system, patient reports getting them done in a private office and that his mom has the results which she will fax to the office. Patient informed that there is a theoretical risk of ADHD medication exacerbating OCD symptoms, he reports understanding. Will start low dose atomoxetine and monitor for worsening of symptoms. Patient also warned re: potential for zoloft and atomoxetine to produce cardiac effects, instructed to let writer know if he feels palpitations, dizziness, or chest pain. He reports understanding.  Pt tolerating low dose atomoxetine well with minor improvements in motivation, will continue to titrate.

## 2024-03-27 DIAGNOSIS — F33.1 MAJOR DEPRESSIVE DISORDER, RECURRENT, MODERATE: ICD-10-CM

## 2024-04-30 ENCOUNTER — OUTPATIENT (OUTPATIENT)
Dept: OUTPATIENT SERVICES | Facility: HOSPITAL | Age: 24
LOS: 1 days | End: 2024-04-30
Payer: COMMERCIAL

## 2024-04-30 ENCOUNTER — APPOINTMENT (OUTPATIENT)
Dept: PSYCHIATRY | Facility: CLINIC | Age: 24
End: 2024-04-30
Payer: COMMERCIAL

## 2024-04-30 DIAGNOSIS — Z87.898 PERSONAL HISTORY OF OTHER SPECIFIED CONDITIONS: ICD-10-CM

## 2024-04-30 DIAGNOSIS — F42.2 MIXED OBSESSIONAL THOUGHTS AND ACTS: ICD-10-CM

## 2024-04-30 DIAGNOSIS — R41.840 ATTENTION AND CONCENTRATION DEFICIT: ICD-10-CM

## 2024-04-30 PROCEDURE — 99213 OFFICE O/P EST LOW 20 MIN: CPT | Mod: 95

## 2024-04-30 PROCEDURE — ZZZZZ: CPT

## 2024-04-30 NOTE — REASON FOR VISIT
[Patient preference] : as per patient preference [Continuing, patient not seen in-person within last 12 months (provide details below)] : Telehealth services are continuing, patient not seen in-person within last 12 months.  [Telehealth (audio & video) - Individual/Group] : This visit was provided via telehealth using real-time 2-way audio visual technology. [Medical Office: ([facility])___] : The provider was located at the medical office in [unfilled]. [Home] : The patient, [unfilled], was located at home, [unfilled], at the time of the visit. [Verbal consent obtained from patient/other participant(s)] : Verbal consent for telehealth/telephonic services obtained from patient/other participant(s) [Patient] : Patient [Follow-Up Visit] : a follow-up visit [FreeTextEntry1] : medication management

## 2024-04-30 NOTE — PLAN
[Medication education provided] : Medication education provided. [Rationale for medication choices, possible risks/precautions, benefits, alternative treatment choices, and consequences of non-treatment discussed] : Rationale for medication choices, possible risks/precautions, benefits, alternative treatment choices, and consequences of non-treatment discussed with patient/family/caregiver  [FreeTextEntry4] : improvement of executive function skills, remission of OCD symptoms, building coping skills and distraction tools for dealing with intrusive thoughts and compulsions [FreeTextEntry5] : - incrase atomoxetine to 80 mg qAM for sxs of inattention - Continue with Zoloft 250mg daily for OCD  - continue melatonin 3 mg PRN for sleep - discussed transfer of care to new provider in July - RTC in 5 weeks

## 2024-04-30 NOTE — DISCUSSION/SUMMARY
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    Patient following in clinic for management of OCD symptoms, with remission of depressive symptoms. Patient continues to have intrusive thoughts of "going to hell" associated with compulsions. Tolerating current medication regimen with improvement in symptoms, currently stable and manageable. Discussed health management and writer reviewed the risks and benefits of current treatment/medication and discussed alternatives.  Pt expressed informed understanding.  Despite the risks associated with taking a high dose of SSRI, considering the patient's debilitating level of OCD symptoms (at baseline), the benefits outweigh those risks; furthermore the pt is understanding of the risks and potential adverse effects and has elected to continue current treatment plan.   Patient reports symptoms of inattention and procrastination that is disrupting his ability to turn in all his assignments in college. QTc on 2/21/24 is 390, no labs are seen in the system, patient reports getting them done in a private office and that his mom has the results which she will fax to the office. Patient informed that there is a theoretical risk of ADHD medication exacerbating OCD symptoms, he reports understanding. Will start low dose atomoxetine and monitor for worsening of symptoms. Patient also warned re: potential for zoloft and atomoxetine to produce cardiac effects, instructed to let writer know if he feels palpitations, dizziness, or chest pain. He reports understanding.  Pt tolerating atomoxetine well with improvements in motivation and functionality without worsening of OCD, will continue to titrate.

## 2024-04-30 NOTE — PAST MEDICAL HISTORY
[FreeTextEntry1] : PPH: 1IPP admission in 2018 for OCD, No SA/NSSIB\par  Past meds: hydroxyzine\par  Substance: denies\par  Social: single, never dated, interested in women, lives with mom and brother, has yoselin tristan, graduated hs. attending [employer] unemployed, enjoys reading and video games (3-4hrs/day). Buddhism.\par  \par  IBS, ASD

## 2024-04-30 NOTE — HISTORY OF PRESENT ILLNESS
[FreeTextEntry1] : Patient assessed over telehealth platform. His bedroom in the background appears more tidy than prior appointments.  Patient reports that he's been doing well. At first he reports that he hasn't noticed any changes with atomoxetine, but then says he feels like it's working a little bit as he can focus better and is getting better at turning in assignments at school. He denies any worsening of OCD symptoms and denies changes in sleep, appetite, anxiety, or mood. He denies any chest pain or cardiac symptoms or any other side effects. He is amenable to increasing dose to 80 mg.  No SI/HI/AVH elicited.  [FreeTextEntry2] : 1 prior IPP admission in 2018 [No] : no

## 2024-04-30 NOTE — PHYSICAL EXAM
[FreeTextEntry2] : unable to assess via telehealth [FreeTextEntry3] : unable to assess via telehealth [FreeTextEntry4] : unable to assess via telehealth [Cooperative] : cooperative [Euthymic] : euthymic [Full] : full [Clear] : clear [Linear/Goal Directed] : linear/goal directed [[place]] : concrete [Obsessional] : obsessional [None Reported] : none reported [Average] : average [WNL] : within normal limits [FreeTextEntry1] : bedroom in the background appears tidier than prior assessments [FreeTextEntry7] : denies any worsening of obsessive thoughts

## 2024-05-01 DIAGNOSIS — R41.840 ATTENTION AND CONCENTRATION DEFICIT: ICD-10-CM

## 2024-05-01 DIAGNOSIS — F42.2 MIXED OBSESSIONAL THOUGHTS AND ACTS: ICD-10-CM

## 2024-06-04 ENCOUNTER — OUTPATIENT (OUTPATIENT)
Dept: OUTPATIENT SERVICES | Facility: HOSPITAL | Age: 24
LOS: 1 days | End: 2024-06-04
Payer: COMMERCIAL

## 2024-06-04 ENCOUNTER — APPOINTMENT (OUTPATIENT)
Dept: PSYCHIATRY | Facility: CLINIC | Age: 24
End: 2024-06-04
Payer: COMMERCIAL

## 2024-06-04 DIAGNOSIS — F42.2 MIXED OBSESSIONAL THOUGHTS AND ACTS: ICD-10-CM

## 2024-06-04 DIAGNOSIS — R41.840 ATTENTION AND CONCENTRATION DEFICIT: ICD-10-CM

## 2024-06-04 PROCEDURE — ZZZZZ: CPT

## 2024-06-04 PROCEDURE — 99213 OFFICE O/P EST LOW 20 MIN: CPT | Mod: 95

## 2024-06-04 RX ORDER — SERTRALINE HYDROCHLORIDE 100 MG/1
100 TABLET, FILM COATED ORAL
Qty: 60 | Refills: 1 | Status: ACTIVE | COMMUNITY
Start: 1900-01-01 | End: 1900-01-01

## 2024-06-04 RX ORDER — ATOMOXETINE 40 MG/1
40 CAPSULE ORAL
Qty: 30 | Refills: 1 | Status: ACTIVE | COMMUNITY
Start: 2024-02-27 | End: 1900-01-01

## 2024-06-04 RX ORDER — SERTRALINE HYDROCHLORIDE 50 MG/1
50 TABLET, FILM COATED ORAL
Qty: 30 | Refills: 1 | Status: ACTIVE | COMMUNITY
Start: 2021-06-30 | End: 1900-01-01

## 2024-06-04 NOTE — DISCUSSION/SUMMARY
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    Patient following in clinic for management of OCD symptoms, with remission of depressive symptoms. Patient continues to have intrusive thoughts of "going to hell" associated with compulsions. Tolerating current medication regimen with improvement in symptoms, currently stable and manageable. Discussed health management and writer reviewed the risks and benefits of current treatment/medication and discussed alternatives.  Pt expressed informed understanding.  Despite the risks associated with taking a high dose of SSRI, considering the patient's debilitating level of OCD symptoms (at baseline), the benefits outweigh those risks; furthermore the pt is understanding of the risks and potential adverse effects and has elected to continue current treatment plan.   Patient reports symptoms of inattention and procrastination that is disrupting his ability to turn in all his assignments in college. QTc on 2/21/24 is 390, no labs are seen in the system, patient reports getting them done in a private office and that his mom has the results which she will fax to the office. Patient informed that there is a theoretical risk of ADHD medication exacerbating OCD symptoms, he reports understanding. Will start low dose atomoxetine and monitor for worsening of symptoms. Patient also warned re: potential for zoloft and atomoxetine to produce cardiac effects, instructed to let writer know if he feels palpitations, dizziness, or chest pain. He reports understanding.  Pt tolerating atomoxetine well with improvements in motivation and functionality without worsening of OCD, however is not sure whether increased dose has helped with school as he is between semesters and may be graduating soon. Agrees to lower dose back to 40 mg over the summer, will reassess need to increase dose as patient's workload changes.

## 2024-06-04 NOTE — HISTORY OF PRESENT ILLNESS
[FreeTextEntry1] : Patient assessed over telehealth platform.   Patient reports that he's been doing well. At first he reports that he hasn't noticed any changes with atomoxetine, but then says he feels like it's working a little bit as he has been better at cleaning his room. He reports that he's in-between semesters so hasn't noticed any changes in his schoolwork, reports that he's talking to his advisor and may be able to graduate or may need to take a class in the fall. He denies any worsening of OCD symptoms and denies changes in sleep, appetite, anxiety, or mood. He denies any chest pain or cardiac symptoms or any other side effects. He is amenable to decreasing Atomoxetine dose to 40 mg during the summer and reassessing need for increased dose when he's back in school/work.  No SI/HI/AVH elicited.  [FreeTextEntry2] : 1 prior IPP admission in 2018 [No] : no

## 2024-06-04 NOTE — PLAN
[No] : No [Medication education provided] : Medication education provided. [Rationale for medication choices, possible risks/precautions, benefits, alternative treatment choices, and consequences of non-treatment discussed] : Rationale for medication choices, possible risks/precautions, benefits, alternative treatment choices, and consequences of non-treatment discussed with patient/family/caregiver  [FreeTextEntry4] : improvement of executive function skills, remission of OCD symptoms, building coping skills and distraction tools for dealing with intrusive thoughts and compulsions [FreeTextEntry5] : - decrease atomoxetine to 40 mg qAM over the summer, can reassess need to increase - Continue with Zoloft 250mg daily for OCD  - continue melatonin 3 mg PRN for sleep - RTC in 7 weeks

## 2024-06-04 NOTE — REASON FOR VISIT
[Patient preference] : as per patient preference [Continuing, patient not seen in-person within last 12 months (provide details below)] : Telehealth services are continuing, patient not seen in-person within last 12 months.  [Telehealth (audio & video) - Individual/Group] : This visit was provided via telehealth using real-time 2-way audio visual technology. [Medical Office: ([facility])___] : The provider was located at the medical office in [unfilled]. [Home] : The patient, [unfilled], was located at home, [unfilled], at the time of the visit. [Verbal consent obtained from patient/other participant(s)] : Verbal consent for telehealth/telephonic services obtained from patient/other participant(s) [Patient] : Patient [Follow-Up Visit] : a follow-up visit [FreeTextEntry1] : medication management

## 2024-06-04 NOTE — PHYSICAL EXAM
[FreeTextEntry2] : unable to assess via telehealth [FreeTextEntry3] : unable to assess via telehealth [FreeTextEntry4] : unable to assess via telehealth [Cooperative] : cooperative [Euthymic] : euthymic [Full] : full [Clear] : clear [Linear/Goal Directed] : linear/goal directed [[place]] : concrete [Obsessional] : obsessional [None Reported] : none reported [Average] : average [WNL] : within normal limits [FreeTextEntry1] : bedroom in the background appears tidier than prior assessments [FreeTextEntry7] : denies any worsening of obsessive thoughts

## 2024-06-05 DIAGNOSIS — R41.840 ATTENTION AND CONCENTRATION DEFICIT: ICD-10-CM

## 2024-06-05 DIAGNOSIS — F42.2 MIXED OBSESSIONAL THOUGHTS AND ACTS: ICD-10-CM

## 2024-07-10 ENCOUNTER — NON-APPOINTMENT (OUTPATIENT)
Age: 24
End: 2024-07-10

## 2024-07-23 ENCOUNTER — APPOINTMENT (OUTPATIENT)
Dept: PSYCHIATRY | Facility: CLINIC | Age: 24
End: 2024-07-23
Payer: COMMERCIAL

## 2024-07-23 DIAGNOSIS — R41.840 ATTENTION AND CONCENTRATION DEFICIT: ICD-10-CM

## 2024-07-23 DIAGNOSIS — F42.2 MIXED OBSESSIONAL THOUGHTS AND ACTS: ICD-10-CM

## 2024-07-23 PROCEDURE — ZZZZZ: CPT

## 2024-07-23 NOTE — DISCUSSION/SUMMARY
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    Patient following in clinic for management of OCD symptoms, with remission of depressive symptoms. Patient continues to have intrusive thoughts of "going to hell" associated with compulsions. Tolerating current medication regimen with improvement in symptoms, currently stable and manageable. Discussed health management and writer reviewed the risks and benefits of current treatment/medication and discussed alternatives.  Pt expressed informed understanding.  Despite the risks associated with taking a high dose of SSRI, considering the patient's debilitating level of OCD symptoms (at baseline), the benefits outweigh those risks; furthermore the pt is understanding of the risks and potential adverse effects and has elected to continue current treatment plan.   Patient reports symptoms of inattention and procrastination that is disrupting his ability to turn in all his assignments in college. QTc on 2/21/24 is 390, no labs are seen in the system, patient reports getting them done in a private office and that his mom has the results which she will fax to the office. Patient informed that there is a theoretical risk of ADHD medication exacerbating OCD symptoms, he reports understanding. Will start low dose atomoxetine and monitor for worsening of symptoms. Patient also warned re: potential for zoloft and atomoxetine to produce cardiac effects, instructed to let writer know if he feels palpitations, dizziness, or chest pain. He reports understanding.  Pt tolerated atomoxetine well up to 80 mg without worsening of OCD sxs but self d/c-ed over the summer due to not being in school. Will plan to restart once school starts. Pt agrees to start with 40 mg qday and double his dose to 80 mg after a week and call writer to send in a refill if necessary as he should have a few remaining at pharmacy. Callback number provided.

## 2024-07-23 NOTE — PHYSICAL EXAM
[FreeTextEntry2] : unable to assess via telehealth [FreeTextEntry3] : unable to assess via telehealth [FreeTextEntry4] : unable to assess via telehealth [Cooperative] : cooperative [Euthymic] : euthymic [Full] : full [Clear] : clear [Linear/Goal Directed] : linear/goal directed [[place]] : concrete [Obsessional] : obsessional [None Reported] : none reported [Average] : average [WNL] : within normal limits [FreeTextEntry7] : denies any worsening of obsessive thoughts

## 2024-07-23 NOTE — REASON FOR VISIT
[Patient preference] : as per patient preference [Continuing, patient not seen in-person within last 12 months (provide details below)] : Telehealth services are continuing, patient not seen in-person within last 12 months.  [Telehealth (audio & video) - Individual/Group] : This visit was provided via telehealth using real-time 2-way audio visual technology. [Medical Office: ([facility])___] : The provider was located at the medical office in [unfilled]. [Home] : The patient, [unfilled], was located at home, [unfilled], at the time of the visit. [Verbal consent obtained from patient/other participant(s)] : Verbal consent for telehealth/telephonic services obtained from patient/other participant(s) [Patient] : Patient [Follow-Up Visit] : a follow-up visit [FreeTextEntry1] : medication management

## 2024-07-23 NOTE — PLAN
[No] : No [Medication education provided] : Medication education provided. [Rationale for medication choices, possible risks/precautions, benefits, alternative treatment choices, and consequences of non-treatment discussed] : Rationale for medication choices, possible risks/precautions, benefits, alternative treatment choices, and consequences of non-treatment discussed with patient/family/caregiver  [FreeTextEntry4] : improvement of executive function skills, remission of OCD symptoms, building coping skills and distraction tools for dealing with intrusive thoughts and compulsions [FreeTextEntry5] : - restart atomoxetine at 40 mg qday for 1 week after school starts, then increase to 80 mg qday - Continue with Zoloft 250mg daily for OCD  - continue melatonin 3 mg PRN for sleep - RTC in 7 weeks

## 2024-07-23 NOTE — HISTORY OF PRESENT ILLNESS
[FreeTextEntry1] : Patient assessed over telehealth platform.   Patient reports that he's been doing well. He has been spending his summer talking with friends and hanigng out with family. He reports that he stopped his atomoxetine entirely during the summer and plans to restart once school starts at the end of August. He denies any worsening of OCD symptoms (obsessive thoughts twice a day, able to resist compulsion) and denies changes in sleep, appetite, anxiety, or mood. He denies any chest pain or cardiac symptoms or any other side effects.  No SI/HI/AVH elicited.  [FreeTextEntry2] : 1 prior IPP admission in 2018 [No] : no

## 2024-07-23 NOTE — PAST MEDICAL HISTORY
[FreeTextEntry1] : PPH: 1IPP admission in 2018 for OCD, No SA/NSSIB\par  Past meds: hydroxyzine\par  Substance: denies\par  Social: single, never dated, interested in women, lives with mom and brother, has yoselin tristan, graduated hs. attending [employer] unemployed, enjoys reading and video games (3-4hrs/day). [employer].\par  \par  IBS, ASD

## 2024-07-24 NOTE — PROGRESS NOTE BEHAVIORAL HEALTH - THOUGHT CONTENT
Other/Obsessions
34
Obsessions/Other
Other/Obsessions
Other/Obsessions

## 2024-09-09 ENCOUNTER — APPOINTMENT (OUTPATIENT)
Dept: PSYCHIATRY | Facility: CLINIC | Age: 24
End: 2024-09-09

## 2024-10-26 NOTE — ED PEDIATRIC NURSE NOTE - OBJECTIVE STATEMENT
Pt Pt c/o depression, decreased appetite, no motivations to do things, having auditory hallucinations x 3 days, stating "Devil tells me to do things and If I don't do it, something is going to happen." Pt also had thoughts of hurting others, stating "Devil tells me to hurt my mother." No active thoughts of suicidal ideation or homicidal ideation at this time. Pt recently saw PMD and was started on zoloft oct 1st. Admitted

## 2024-12-20 ENCOUNTER — APPOINTMENT (OUTPATIENT)
Dept: PSYCHIATRY | Facility: CLINIC | Age: 24
End: 2024-12-20
Payer: COMMERCIAL

## 2024-12-20 ENCOUNTER — OUTPATIENT (OUTPATIENT)
Dept: OUTPATIENT SERVICES | Facility: HOSPITAL | Age: 24
LOS: 1 days | End: 2024-12-20
Payer: COMMERCIAL

## 2024-12-20 DIAGNOSIS — R41.840 ATTENTION AND CONCENTRATION DEFICIT: ICD-10-CM

## 2024-12-20 DIAGNOSIS — F42.2 MIXED OBSESSIONAL THOUGHTS AND ACTS: ICD-10-CM

## 2024-12-20 PROCEDURE — ZZZZZ: CPT

## 2024-12-20 PROCEDURE — 99214 OFFICE O/P EST MOD 30 MIN: CPT | Mod: 95

## 2024-12-21 DIAGNOSIS — F42.2 MIXED OBSESSIONAL THOUGHTS AND ACTS: ICD-10-CM

## 2025-01-02 ENCOUNTER — NON-APPOINTMENT (OUTPATIENT)
Age: 25
End: 2025-01-02

## 2025-01-13 ENCOUNTER — OUTPATIENT (OUTPATIENT)
Dept: OUTPATIENT SERVICES | Facility: HOSPITAL | Age: 25
LOS: 1 days | End: 2025-01-13
Payer: COMMERCIAL

## 2025-01-13 ENCOUNTER — APPOINTMENT (OUTPATIENT)
Dept: PSYCHIATRY | Facility: CLINIC | Age: 25
End: 2025-01-13
Payer: COMMERCIAL

## 2025-01-13 DIAGNOSIS — R41.840 ATTENTION AND CONCENTRATION DEFICIT: ICD-10-CM

## 2025-01-13 DIAGNOSIS — F42.2 MIXED OBSESSIONAL THOUGHTS AND ACTS: ICD-10-CM

## 2025-01-13 PROCEDURE — 98007 SYNCH AUDIO-VIDEO EST HI 40: CPT

## 2025-01-13 PROCEDURE — ZZZZZ: CPT

## 2025-01-14 DIAGNOSIS — R41.840 ATTENTION AND CONCENTRATION DEFICIT: ICD-10-CM

## 2025-01-14 DIAGNOSIS — F42.2 MIXED OBSESSIONAL THOUGHTS AND ACTS: ICD-10-CM

## 2025-02-07 ENCOUNTER — OUTPATIENT (OUTPATIENT)
Dept: OUTPATIENT SERVICES | Facility: HOSPITAL | Age: 25
LOS: 1 days | End: 2025-02-07
Payer: COMMERCIAL

## 2025-02-07 ENCOUNTER — APPOINTMENT (OUTPATIENT)
Dept: PSYCHIATRY | Facility: CLINIC | Age: 25
End: 2025-02-07
Payer: COMMERCIAL

## 2025-02-07 DIAGNOSIS — R41.840 ATTENTION AND CONCENTRATION DEFICIT: ICD-10-CM

## 2025-02-07 DIAGNOSIS — F42.2 MIXED OBSESSIONAL THOUGHTS AND ACTS: ICD-10-CM

## 2025-02-07 PROCEDURE — ZZZZZ: CPT

## 2025-02-07 PROCEDURE — 98007 SYNCH AUDIO-VIDEO EST HI 40: CPT

## 2025-02-08 DIAGNOSIS — F42.2 MIXED OBSESSIONAL THOUGHTS AND ACTS: ICD-10-CM

## 2025-03-03 ENCOUNTER — OUTPATIENT (OUTPATIENT)
Dept: OUTPATIENT SERVICES | Facility: HOSPITAL | Age: 25
LOS: 1 days | End: 2025-03-03
Payer: COMMERCIAL

## 2025-03-03 ENCOUNTER — APPOINTMENT (OUTPATIENT)
Dept: PSYCHIATRY | Facility: CLINIC | Age: 25
End: 2025-03-03
Payer: COMMERCIAL

## 2025-03-03 DIAGNOSIS — F42.2 MIXED OBSESSIONAL THOUGHTS AND ACTS: ICD-10-CM

## 2025-03-03 DIAGNOSIS — R41.840 ATTENTION AND CONCENTRATION DEFICIT: ICD-10-CM

## 2025-03-03 PROCEDURE — ZZZZZ: CPT

## 2025-03-03 PROCEDURE — 98007 SYNCH AUDIO-VIDEO EST HI 40: CPT

## 2025-03-04 DIAGNOSIS — F42.2 MIXED OBSESSIONAL THOUGHTS AND ACTS: ICD-10-CM

## 2025-03-11 ENCOUNTER — NON-APPOINTMENT (OUTPATIENT)
Age: 25
End: 2025-03-11

## 2025-04-08 DIAGNOSIS — F84.0 AUTISTIC DISORDER: ICD-10-CM

## 2025-04-25 ENCOUNTER — APPOINTMENT (OUTPATIENT)
Dept: PSYCHIATRY | Facility: CLINIC | Age: 25
End: 2025-04-25

## 2025-05-02 ENCOUNTER — OUTPATIENT (OUTPATIENT)
Dept: OUTPATIENT SERVICES | Facility: HOSPITAL | Age: 25
LOS: 1 days | End: 2025-05-02
Payer: COMMERCIAL

## 2025-05-02 ENCOUNTER — APPOINTMENT (OUTPATIENT)
Dept: PSYCHIATRY | Facility: CLINIC | Age: 25
End: 2025-05-02
Payer: COMMERCIAL

## 2025-05-02 DIAGNOSIS — R41.840 ATTENTION AND CONCENTRATION DEFICIT: ICD-10-CM

## 2025-05-02 DIAGNOSIS — F84.0 AUTISTIC DISORDER: ICD-10-CM

## 2025-05-02 DIAGNOSIS — F42.2 MIXED OBSESSIONAL THOUGHTS AND ACTS: ICD-10-CM

## 2025-05-02 PROCEDURE — 98007 SYNCH AUDIO-VIDEO EST HI 40: CPT

## 2025-05-02 PROCEDURE — ZZZZZ: CPT

## 2025-05-03 DIAGNOSIS — R41.840 ATTENTION AND CONCENTRATION DEFICIT: ICD-10-CM

## 2025-05-03 DIAGNOSIS — F42.2 MIXED OBSESSIONAL THOUGHTS AND ACTS: ICD-10-CM

## 2025-05-03 DIAGNOSIS — F84.0 AUTISTIC DISORDER: ICD-10-CM

## 2025-05-23 ENCOUNTER — APPOINTMENT (OUTPATIENT)
Dept: PSYCHIATRY | Facility: CLINIC | Age: 25
End: 2025-05-23

## 2025-05-23 ENCOUNTER — OUTPATIENT (OUTPATIENT)
Dept: OUTPATIENT SERVICES | Facility: HOSPITAL | Age: 25
LOS: 1 days | End: 2025-05-23
Payer: COMMERCIAL

## 2025-05-23 DIAGNOSIS — R41.840 ATTENTION AND CONCENTRATION DEFICIT: ICD-10-CM

## 2025-05-23 DIAGNOSIS — F84.0 AUTISTIC DISORDER: ICD-10-CM

## 2025-05-23 DIAGNOSIS — F42.2 MIXED OBSESSIONAL THOUGHTS AND ACTS: ICD-10-CM

## 2025-05-23 PROCEDURE — ZZZZZ: CPT

## 2025-05-23 PROCEDURE — 98007 SYNCH AUDIO-VIDEO EST HI 40: CPT

## 2025-05-24 DIAGNOSIS — F84.0 AUTISTIC DISORDER: ICD-10-CM

## 2025-05-24 DIAGNOSIS — R41.840 ATTENTION AND CONCENTRATION DEFICIT: ICD-10-CM

## 2025-05-24 DIAGNOSIS — F42.2 MIXED OBSESSIONAL THOUGHTS AND ACTS: ICD-10-CM

## 2025-06-20 ENCOUNTER — APPOINTMENT (OUTPATIENT)
Dept: PSYCHIATRY | Facility: CLINIC | Age: 25
End: 2025-06-20
Payer: COMMERCIAL

## 2025-06-20 ENCOUNTER — OUTPATIENT (OUTPATIENT)
Dept: OUTPATIENT SERVICES | Facility: HOSPITAL | Age: 25
LOS: 1 days | End: 2025-06-20
Payer: COMMERCIAL

## 2025-06-20 DIAGNOSIS — F84.0 AUTISTIC DISORDER: ICD-10-CM

## 2025-06-20 DIAGNOSIS — R41.840 ATTENTION AND CONCENTRATION DEFICIT: ICD-10-CM

## 2025-06-20 DIAGNOSIS — F42.2 MIXED OBSESSIONAL THOUGHTS AND ACTS: ICD-10-CM

## 2025-06-20 PROCEDURE — ZZZZZ: CPT

## 2025-06-20 PROCEDURE — 98007 SYNCH AUDIO-VIDEO EST HI 40: CPT

## 2025-06-21 DIAGNOSIS — R41.840 ATTENTION AND CONCENTRATION DEFICIT: ICD-10-CM

## 2025-06-21 DIAGNOSIS — F84.0 AUTISTIC DISORDER: ICD-10-CM

## 2025-06-21 DIAGNOSIS — F42.2 MIXED OBSESSIONAL THOUGHTS AND ACTS: ICD-10-CM

## 2025-07-23 ENCOUNTER — OUTPATIENT (OUTPATIENT)
Dept: OUTPATIENT SERVICES | Facility: HOSPITAL | Age: 25
LOS: 1 days | End: 2025-07-23
Payer: COMMERCIAL

## 2025-07-23 ENCOUNTER — APPOINTMENT (OUTPATIENT)
Dept: PSYCHIATRY | Facility: CLINIC | Age: 25
End: 2025-07-23
Payer: COMMERCIAL

## 2025-07-23 DIAGNOSIS — R41.840 ATTENTION AND CONCENTRATION DEFICIT: ICD-10-CM

## 2025-07-23 DIAGNOSIS — F84.0 AUTISTIC DISORDER: ICD-10-CM

## 2025-07-23 DIAGNOSIS — Z86.59 PERSONAL HISTORY OF OTHER MENTAL AND BEHAVIORAL DISORDERS: ICD-10-CM

## 2025-07-23 DIAGNOSIS — F42.2 MIXED OBSESSIONAL THOUGHTS AND ACTS: ICD-10-CM

## 2025-07-23 PROCEDURE — 99214 OFFICE O/P EST MOD 30 MIN: CPT | Mod: NC

## 2025-07-23 PROCEDURE — ZZZZZ: CPT

## 2025-07-23 PROCEDURE — 99214 OFFICE O/P EST MOD 30 MIN: CPT

## 2025-07-24 DIAGNOSIS — F42.2 MIXED OBSESSIONAL THOUGHTS AND ACTS: ICD-10-CM

## 2025-07-24 DIAGNOSIS — F84.0 AUTISTIC DISORDER: ICD-10-CM

## 2025-07-24 DIAGNOSIS — R41.840 ATTENTION AND CONCENTRATION DEFICIT: ICD-10-CM

## 2025-08-12 ENCOUNTER — OUTPATIENT (OUTPATIENT)
Dept: OUTPATIENT SERVICES | Facility: HOSPITAL | Age: 25
LOS: 1 days | End: 2025-08-12
Payer: COMMERCIAL

## 2025-08-12 ENCOUNTER — APPOINTMENT (OUTPATIENT)
Dept: PSYCHIATRY | Facility: CLINIC | Age: 25
End: 2025-08-12
Payer: COMMERCIAL

## 2025-08-12 DIAGNOSIS — F42.2 MIXED OBSESSIONAL THOUGHTS AND ACTS: ICD-10-CM

## 2025-08-12 DIAGNOSIS — R41.840 ATTENTION AND CONCENTRATION DEFICIT: ICD-10-CM

## 2025-08-12 DIAGNOSIS — F84.0 AUTISTIC DISORDER: ICD-10-CM

## 2025-08-12 PROCEDURE — ZZZZZ: CPT

## 2025-08-12 PROCEDURE — 98006 SYNCH AUDIO-VIDEO EST MOD 30: CPT

## 2025-08-13 DIAGNOSIS — F42.2 MIXED OBSESSIONAL THOUGHTS AND ACTS: ICD-10-CM

## 2025-08-13 DIAGNOSIS — F84.0 AUTISTIC DISORDER: ICD-10-CM

## 2025-08-13 DIAGNOSIS — R41.840 ATTENTION AND CONCENTRATION DEFICIT: ICD-10-CM

## 2025-09-11 ENCOUNTER — APPOINTMENT (OUTPATIENT)
Dept: PSYCHIATRY | Facility: CLINIC | Age: 25
End: 2025-09-11
Payer: COMMERCIAL

## 2025-09-11 DIAGNOSIS — F84.0 AUTISTIC DISORDER: ICD-10-CM

## 2025-09-11 DIAGNOSIS — R41.840 ATTENTION AND CONCENTRATION DEFICIT: ICD-10-CM

## 2025-09-11 DIAGNOSIS — F42.2 MIXED OBSESSIONAL THOUGHTS AND ACTS: ICD-10-CM

## 2025-09-11 PROCEDURE — ZZZZZ: CPT
